# Patient Record
Sex: MALE | Race: WHITE | NOT HISPANIC OR LATINO | Employment: OTHER | URBAN - NONMETROPOLITAN AREA
[De-identification: names, ages, dates, MRNs, and addresses within clinical notes are randomized per-mention and may not be internally consistent; named-entity substitution may affect disease eponyms.]

---

## 2019-07-12 ENCOUNTER — PREP FOR SURGERY (OUTPATIENT)
Dept: OTHER | Facility: HOSPITAL | Age: 69
End: 2019-07-12

## 2019-07-12 DIAGNOSIS — R97.20 ELEVATED PROSTATE SPECIFIC ANTIGEN (PSA): Primary | ICD-10-CM

## 2019-07-26 ENCOUNTER — HOSPITAL ENCOUNTER (OUTPATIENT)
Dept: ULTRASOUND IMAGING | Facility: HOSPITAL | Age: 69
Discharge: HOME | End: 2019-07-26
Admitting: UROLOGY

## 2019-07-26 VITALS
TEMPERATURE: 96.3 F | SYSTOLIC BLOOD PRESSURE: 147 MMHG | OXYGEN SATURATION: 96 % | HEART RATE: 79 BPM | DIASTOLIC BLOOD PRESSURE: 88 MMHG | RESPIRATION RATE: 18 BRPM

## 2019-07-26 DIAGNOSIS — R97.20 ELEVATED PROSTATE SPECIFIC ANTIGEN (PSA): ICD-10-CM

## 2019-07-26 PROCEDURE — 63710000001 DIAZEPAM 5 MG TABLET: Performed by: UROLOGY

## 2019-07-26 PROCEDURE — 76942 ECHO GUIDE FOR BIOPSY: CPT

## 2019-07-26 PROCEDURE — A9270 NON-COVERED ITEM OR SERVICE: HCPCS | Performed by: UROLOGY

## 2019-07-26 PROCEDURE — 88305 TISSUE EXAM BY PATHOLOGIST: CPT | Performed by: PATHOLOGY

## 2019-07-26 PROCEDURE — G0416 PROSTATE BIOPSY, ANY MTHD: HCPCS | Performed by: UROLOGY

## 2019-07-26 RX ORDER — DIAZEPAM 5 MG/1
10 TABLET ORAL ONCE
Status: COMPLETED | OUTPATIENT
Start: 2019-07-26 | End: 2019-07-26

## 2019-07-26 RX ADMIN — DIAZEPAM 10 MG: 5 TABLET ORAL at 13:25

## 2019-07-30 LAB
LAB AP CASE REPORT: NORMAL
LAB AP DIAGNOSIS COMMENT: NORMAL
LAB AP INTRADEPARTMENTAL CONSULT: NORMAL
PATH REPORT.FINAL DX SPEC: NORMAL
PATH REPORT.GROSS SPEC: NORMAL

## 2019-08-13 ENCOUNTER — HOSPITAL ENCOUNTER (OUTPATIENT)
Dept: NUCLEAR MEDICINE | Facility: HOSPITAL | Age: 69
Discharge: HOME | End: 2019-08-13

## 2019-08-13 ENCOUNTER — HOSPITAL ENCOUNTER (OUTPATIENT)
Dept: CT IMAGING | Facility: HOSPITAL | Age: 69
Discharge: HOME | End: 2019-08-13
Admitting: UROLOGY

## 2019-08-13 DIAGNOSIS — C61 MALIGNANT NEOPLASM OF PROSTATE (HCC): ICD-10-CM

## 2019-08-13 PROCEDURE — 25010000002 IOPAMIDOL 61 % SOLUTION: Performed by: UROLOGY

## 2019-08-13 PROCEDURE — A9503 TC99M MEDRONATE: HCPCS | Performed by: UROLOGY

## 2019-08-13 PROCEDURE — 78306 BONE IMAGING WHOLE BODY: CPT

## 2019-08-13 PROCEDURE — 74177 CT ABD & PELVIS W/CONTRAST: CPT

## 2019-08-13 PROCEDURE — 0 TECHNETIUM MEDRONATE KIT: Performed by: UROLOGY

## 2019-08-13 RX ORDER — TC 99M MEDRONATE 20 MG/10ML
25.3 INJECTION, POWDER, LYOPHILIZED, FOR SOLUTION INTRAVENOUS
Status: COMPLETED | OUTPATIENT
Start: 2019-08-13 | End: 2019-08-13

## 2019-08-13 RX ADMIN — Medication 25.3 MILLICURIE: at 09:15

## 2019-08-13 RX ADMIN — IOPAMIDOL 90 ML: 612 INJECTION, SOLUTION INTRAVENOUS at 10:18

## 2019-09-24 ENCOUNTER — HOSPITAL ENCOUNTER (OUTPATIENT)
Dept: RADIATION ONCOLOGY | Facility: HOSPITAL | Age: 69
Setting detail: RADIATION/ONCOLOGY SERIES
End: 2019-09-24

## 2019-09-24 ENCOUNTER — CONSULT (OUTPATIENT)
Dept: RADIATION ONCOLOGY | Facility: HOSPITAL | Age: 69
End: 2019-09-24

## 2019-09-24 VITALS
BODY MASS INDEX: 23.38 KG/M2 | HEIGHT: 72 IN | DIASTOLIC BLOOD PRESSURE: 81 MMHG | HEART RATE: 94 BPM | RESPIRATION RATE: 16 BRPM | WEIGHT: 172.6 LBS | TEMPERATURE: 98 F | SYSTOLIC BLOOD PRESSURE: 122 MMHG

## 2019-09-24 DIAGNOSIS — C61 MALIGNANT NEOPLASM OF PROSTATE (HCC): Primary | ICD-10-CM

## 2019-09-24 PROCEDURE — 99204 OFFICE O/P NEW MOD 45 MIN: CPT | Performed by: RADIOLOGY

## 2019-09-24 PROCEDURE — G0463 HOSPITAL OUTPT CLINIC VISIT: HCPCS | Performed by: RADIOLOGY

## 2019-09-24 RX ORDER — TAMSULOSIN HYDROCHLORIDE 0.4 MG/1
CAPSULE ORAL
COMMUNITY
Start: 2019-09-11

## 2019-09-24 NOTE — PROGRESS NOTES
New Patient Visit       Patient: Kim Kan   YOB: 1950   Medical Record Number: [id number]   Date of Visit: September 24, 2019   Primary Diagnosis:  Stage IIA (cT2b cN0 M0) adenocarcinoma of the prostate, Bob's 3+3=6 with a pretreatment PSA of 5.7 ng/ML (NCCN favorable intermediate risk group).  ICD 10 Code: C61                                               History of Present Illness: Mr. Kim Kan is a 69-year-old white male with recently diagnosed Stage IIA adenocarcinoma of the prostate, [place]'s 3+3=6 with a pretreatment PSA of 5.7 ng/ML.  He has been evaluated by Dr. German and presents to our clinic today to discuss radiation therapy recommendations.    Below is a summary of his relevant history.    7/12/2019 the patient was seen in consultation by Dr. German due to an elevated PSA of 5.7 ng/ML on 6/18/2019.  Digital rectal exam revealed a right sided prostate nodule.  TRUSP with biopsy was scheduled.    7/23/2019 the patient underwent a transrectal ultrasound-guided prostate biopsy per Dr. German.  Pathology: Bob's 3+3=6 adenocarcinoma in 2 of 6 cores (right apex, right middle), involving up to 40% of the biopsy tissue.    8/2/2019 the patient was seen in follow-up by Dr. German.  Staging scans were ordered.    8/13/2019    CT of the abdomen/pelvis revealed an indeterminate small RLL lung nodule, favored to be benign, but follow-up was recommended.  No other evidence to suggest definite metastatic disease in the abdomen or pelvis was identified.     NM whole-body bone scan revealed nonspecific foci of increased uptake in the right side of the mandible, right side of the maxilla, and the right side of the mid cervical spine.  Differential diagnosis included metastatic disease, inflammation, or infection, with additional radiographs recommended.  A foci of increased uptake was also noted in the medial aspect of the right knee and right ankle, probably  kia.    2019 the patient was seen in follow-up by Dr. German.  Treatment options were discussed with the patient, however, no decision was made at that time.    2019 blood work revealed a PSA of 4.300 ng/ML.    2019 the patient was seen in follow-up by Dr. German.  Treatment options were again discussed.  The patient wished to wait until after he had discussed radiation therapy options with us before making a final decision.    The patient presents to our clinic today to discuss radiation therapy options and recommendations.    This is a new problem to me.  (Old medical records were requested, reviewed, and summarized in the HPI)    Review of Systems   Genitourinary: Positive for urgency.       All other systems reviewed and are negative.    AUA/IPSS: 4 (mild symptoms)  QOL due to Urinary Symptoms: 0 (delighted)  MAYA: Incomplete (incomplete)    Past Medical History:   Diagnosis Date   • Arthritis    • Cancer (CMS/HCC)    • Prostate cancer (CMS/HCC) 2019        Past Surgical History:   Procedure Laterality Date   • PROSTATE BIOPSY  2019      Family History   Problem Relation Age of Onset   • Aortic aneurysm Father         Social History     Socioeconomic History   • Marital status:      Spouse name: Not on file   • Number of children: 0   • Years of education: 14   • Highest education level: Not on file   Occupational History   • Occupation: builder   Tobacco Use   • Smoking status: Former Smoker     Packs/day: 2.00     Years: 5.00     Pack years: 10.00     Types: Cigarettes     Start date:      Last attempt to quit:      Years since quittin.7   • Smokeless tobacco: Never Used   Substance and Sexual Activity   • Alcohol use: Yes     Alcohol/week: 2.4 oz     Types: 4 Cans of beer per week   • Drug use: Yes     Types: Marijuana     Comment: 2 times a year   • Sexual activity: Defer     Patient has no known allergies.     Prior to Admission medications   "  Medication Sig Start Date End Date Taking? Authorizing Provider   tamsulosin (FLOMAX) 0.4 MG capsule 24 hr capsule  9/11/19  Yes Provider, MD Niko      Pain: on a scale of 0-10.   Pain Score    09/24/19 1001   PainSc: 0-No pain        Quality of Life:  100 - Full Activity  (0) Fully active, able to carry on all predisease performance without restriction  Advanced Care Plan: N     Physical Examination:  Vitals:     Vitals:    09/24/19 1001   BP: 122/81   Pulse: 94   Resp: 16   Temp: 98 °F (36.7 °C)       Height: 182.9 cm (72\")  Weight:  Weight: 78.3 kg (172 lb 9.6 oz)   Body mass index is 23.41 kg/m².     Constitutional: The patient is a well-developed, well-nourished white male  in no acute distress.  Alert and oriented ×3.  HEENT: Atraumatic. Normocephalic. No abnormalities noted.  Lymphatics: No cervical, supraclavicular, axillary, or inguinal lymphadenopathy is palpated.  CV: Regular rate and rhythm.  No murmurs, rubs, or gallops are appreciated.  Respiratory: Lungs clear to auscultation.  Breath sounds equal bilaterally.  GI: Abdomen soft, nontender, nondistended, with no hepatosplenomegaly or masses palpated.  Rectal: Deferred.  Extremities: No clubbing, cyanosis, or edema.  Neurologic: Cranial nerves II through XII are grossly intact, with no focal neurological deficits noted on exam.  Psychiatric: Alert and oriented x3. Normal affect, with no anxiety or depression noted.    Radiographs :    CT abdomen and pelvis with contrast on  8/13/2019    CLINICAL INDICATION: Prostate cancer  COMPARISON: None   FINDINGS:  Abdomen: There is mild linear atelectasis or scarring in the lung  bases. There are a few right lower lobe small pulmonary nodules  that are indeterminate, one of these is juxtapleural and favored  to be benign with the other two on images 15 and 23 being  indeterminate but favored to be benign. Follow-up of these  however will be needed. The lung bases are otherwise clear. There  is mild fatty " infiltration of the liver. There is a small right  renal cyst. The solid abdominal organs are otherwise  unremarkable. There is no abdominal adenopathy. Vascular  calcifications are noted. There is no free fluid or free air  within the abdomen. The abdominal portion of the GI tract is  unremarkable.   Pelvis: The prostate is enlarged. There is no pelvic adenopathy.  There is no free fluid in the pelvis. The pelvic portion of the  GI tract including the appendix is unremarkable. Degenerative  changes are noted in the lower lumbar spine. Bilateral pars  defects are noted at L5 with grade 2 spondylolisthesis at L5-S1.  No definite bony metastatic lesion is noted.     IMPRESSION:  1. Indeterminate small right lower lobe nodules favored to be  benign but follow-up will be needed.  2. No other evidence to suggest definite metastatic disease in  the abdomen or pelvis.    Whole body bone scan  (8/13/19)   HISTORY:  Prostate cancer    COMPARISON:   none    DOSE:  25.3 mCi of technetium labeled MDP (I.V.)   TECHNIQUE:  Whole body scan, anterior and posterior planar images   FINDINGS:  Physiologic distribution is noted in the kidneys and urinary  bladder.  Foci of increased uptake noted in the medial aspect of the right  knee and in the right ankle, probably degenerative.  Foci of increased uptake in the right side of the mandible and  opposite this in the right side of the maxilla.  Focus of increased uptake noted in the right side of the mid  cervical spine. These findings are nonspecific, cannot exclude  metastatic disease, inflammation, or infection.     IMPRESSION:  CONCLUSION:    1.  Foci of increased uptake in the right side of the mandible  and opposite this in the right side of the maxilla.  Focus of increased uptake noted in the right side of the mid  cervical spine. These findings are nonspecific, cannot exclude  metastatic disease, inflammation, or infection. Recommend  correlation with radiographs.     2. Foci of  increased uptake noted in the medial aspect of the  right knee and in the right ankle, probably degenerative.  Recommend correlation with radiographs.      Pathology:     Tissue Pathology Exam: 7/23/19  Final Diagnosis   1.  PROSTATE GLAND, LEFT APEX, NEEDLE BIOPSY:  BENIGN PROSTATIC TISSUE.   2.  PROSTATE GLAND, LEFT MIDDLE, NEEDLE BIOPSY:  BENIGN PROSTATIC TISSUE.   3.  PROSTATE GLAND, NEEDLE BIOPSY:  BENIGN PROSTATIC TISSUE.   4.  PROSTATE GLAND, RIGHT APEX, NEEDLE BIOPSY:  PROSTATIC ADENOCARCINOMA, BOB'S SCORE 3+3=6 (GRADE GROUP 1), 1 OF 1 CORE, 4 MM, 40% OF THE BIOPSY.   5.  PROSTATE GLAND RIGHT MIDDLE, NEEDLE BIOPSY:  PROSTATIC ADENOCARCINOMA, BOB'S SCORE 3+3=6 (GRADE GROUP 1), 1 OF 1 CORE, 3 MM, 20% OF THE BIOPSY.   6.  PROSTATE GLAND, RIGHT BASE, NEEDLE BIOPSY:  BENIGN PROSTATIC TISSUE.       Labs:   No results found for: GLUCOSE, BUN, CREATININE, EGFRIFNONA, EGFRIFAFRI, BCR, K, CO2, CALCIUM, PROTENTOTREF, ALBUMIN, LABIL2, AST, ALT   No results found for: WBC, HGB, HCT, MCV, PLT     Summary of PSAs:  Date              PSA (ng/ml)  6/2/2017           3.56  9/10/2018         3.82  6/18/2019         5.700    9/9/2019           4.300      ASSESSMENT/PLAN: Mr. Kim Kan is a 69-year-old white male with recently diagnosed Stage IIA (cT2b cN0 M0) adenocarcinoma of the prostate, Bob's 3+3=6 with a pretreatment PSA of 5.7 ng/ML (NCCN favorable intermediate risk group).  The NCCN Guidelines treatment recommendations for favorable intermediate risk group were discussed at length with the patient, including active surveillance, radiation therapy (EBRT or brachytherapy alone), and radical prostatectomy +/- PLND.  All of the patient's questions were answered.  At this time, he feels that he would like to pursue a course of active surveillance rather than radiation therapy or surgery.  He states that he has follow-up scheduled in the near future with Dr. German for a repeat PSA, and plans to continue to  follow closely with Dr. German as part of his active surveillance.  We will be glad to see the patient back in our clinic at any time in the future should he change his mind and decide to proceed with definitive radiation therapy.    Sincerely,    Rene Garcia MD  Radiation Oncology    Electronically signed by Rene Garcia MD 9/24/2019 2:45 PM     cc: Dr. Joe Chaudhary

## 2020-05-29 ENCOUNTER — TRANSCRIBE ORDERS (OUTPATIENT)
Dept: CT IMAGING | Facility: HOSPITAL | Age: 70
End: 2020-05-29

## 2020-05-29 DIAGNOSIS — C61 PROSTATE CANCER (HCC): Primary | ICD-10-CM

## 2020-06-03 ENCOUNTER — HOSPITAL ENCOUNTER (OUTPATIENT)
Dept: CT IMAGING | Facility: HOSPITAL | Age: 70
Discharge: HOME | End: 2020-06-03

## 2020-06-03 ENCOUNTER — HOSPITAL ENCOUNTER (OUTPATIENT)
Dept: NUCLEAR MEDICINE | Facility: HOSPITAL | Age: 70
Discharge: HOME | End: 2020-06-03

## 2020-06-03 ENCOUNTER — LAB (OUTPATIENT)
Dept: LAB | Facility: HOSPITAL | Age: 70
End: 2020-06-03

## 2020-06-03 DIAGNOSIS — C61 PROSTATE CANCER (HCC): ICD-10-CM

## 2020-06-03 LAB
BUN BLD-MCNC: 14 MG/DL (ref 8–23)
CREAT BLD-MCNC: 0.7 MG/DL (ref 0.76–1.27)
GFR SERPL CREATININE-BSD FRML MDRD: 111 ML/MIN/1.73

## 2020-06-03 PROCEDURE — 0 TECHNETIUM MEDRONATE KIT: Performed by: UROLOGY

## 2020-06-03 PROCEDURE — 36415 COLL VENOUS BLD VENIPUNCTURE: CPT

## 2020-06-03 PROCEDURE — A9503 TC99M MEDRONATE: HCPCS | Performed by: UROLOGY

## 2020-06-03 PROCEDURE — 74178 CT ABD&PLV WO CNTR FLWD CNTR: CPT

## 2020-06-03 PROCEDURE — 84520 ASSAY OF UREA NITROGEN: CPT

## 2020-06-03 PROCEDURE — 78306 BONE IMAGING WHOLE BODY: CPT

## 2020-06-03 PROCEDURE — 25010000002 IOPAMIDOL 61 % SOLUTION: Performed by: UROLOGY

## 2020-06-03 PROCEDURE — 82565 ASSAY OF CREATININE: CPT

## 2020-06-03 PROCEDURE — 71260 CT THORAX DX C+: CPT

## 2020-06-03 RX ORDER — TC 99M MEDRONATE 20 MG/10ML
25.1 INJECTION, POWDER, LYOPHILIZED, FOR SOLUTION INTRAVENOUS
Status: COMPLETED | OUTPATIENT
Start: 2020-06-03 | End: 2020-06-03

## 2020-06-03 RX ADMIN — IOPAMIDOL 90 ML: 612 INJECTION, SOLUTION INTRAVENOUS at 09:58

## 2020-06-03 RX ADMIN — Medication 25.1 MILLICURIE: at 09:30

## 2020-06-08 ENCOUNTER — CONSULT (OUTPATIENT)
Dept: SURGERY | Facility: CLINIC | Age: 70
End: 2020-06-08

## 2020-06-08 VITALS
HEART RATE: 63 BPM | SYSTOLIC BLOOD PRESSURE: 120 MMHG | TEMPERATURE: 99 F | DIASTOLIC BLOOD PRESSURE: 80 MMHG | WEIGHT: 160 LBS | BODY MASS INDEX: 21.67 KG/M2 | HEIGHT: 72 IN

## 2020-06-08 DIAGNOSIS — K40.90 LEFT INGUINAL HERNIA: Primary | ICD-10-CM

## 2020-06-08 PROCEDURE — 99203 OFFICE O/P NEW LOW 30 MIN: CPT | Performed by: SURGERY

## 2020-06-08 NOTE — PROGRESS NOTES
CHIEF COMPLAINT:    Left inguinal hernia    HISTORY OF PRESENT ILLNESS:    Kim Kan is a 70 y.o. male who is followed by Dr. German of urology for prostate cancer.  Recently the patient noted a left inguinal bulge which he discussed in the urology clinic.  This appeared to be consistent with a hernia.  The patient was referred to me for further discussion about management of the hernia.    Patient notes no obstructive symptoms.  He notes an occasional bulge in the left groin which reduces with rest.  He notes occasional mild discomfort in the area.    Past Medical History:   Diagnosis Date   • Arthritis    • Cancer (CMS/HCC)    • Prostate cancer (CMS/HCC) 2019       Past Surgical History:   Procedure Laterality Date   • PROSTATE BIOPSY  2019       Prior to Admission medications    Medication Sig Start Date End Date Taking? Authorizing Provider   tamsulosin (FLOMAX) 0.4 MG capsule 24 hr capsule  19  Yes Provider, MD Niko       No Known Allergies    Family History   Problem Relation Age of Onset   • Aortic aneurysm Father        Social History     Socioeconomic History   • Marital status:      Spouse name: Not on file   • Number of children: 0   • Years of education: 14   • Highest education level: Not on file   Occupational History   • Occupation: builder   Tobacco Use   • Smoking status: Former Smoker     Packs/day: 2.00     Years: 5.00     Pack years: 10.00     Types: Cigarettes     Start date:      Last attempt to quit:      Years since quittin.4   • Smokeless tobacco: Never Used   Substance and Sexual Activity   • Alcohol use: Yes     Alcohol/week: 4.0 standard drinks     Types: 4 Cans of beer per week   • Drug use: Yes     Types: Marijuana     Comment: 2 times a year   • Sexual activity: Defer       Review of Systems   Constitutional: Negative for activity change, appetite change, chills and fever.   HENT: Negative for hearing loss, nosebleeds and trouble  "swallowing.    Cardiovascular: Negative for chest pain, palpitations and leg swelling.   Gastrointestinal: Negative for abdominal distention, abdominal pain, anal bleeding, blood in stool, constipation, diarrhea, nausea, rectal pain and vomiting.   Endocrine: Negative for cold intolerance, heat intolerance, polydipsia and polyuria.   Genitourinary: Positive for difficulty urinating. Negative for decreased urine volume, dysuria, enuresis, frequency, hematuria and urgency.   Musculoskeletal: Positive for back pain. Negative for arthralgias, gait problem, myalgias and neck pain.   Skin: Negative for pallor, rash and wound.   Allergic/Immunologic: Negative for immunocompromised state.   Neurological: Negative for dizziness, seizures, weakness, light-headedness, numbness and headaches.   Psychiatric/Behavioral: Negative for agitation and behavioral problems. The patient is not nervous/anxious.        Objective     /80   Pulse 63   Temp 99 °F (37.2 °C) (Oral)   Ht 182.9 cm (72\")   Wt 72.6 kg (160 lb)   BMI 21.70 kg/m²     Physical Exam   Constitutional: He is oriented to person, place, and time. He appears well-developed and well-nourished. No distress.   HENT:   Head: Normocephalic and atraumatic.   Eyes: EOM are normal. Right eye exhibits no discharge. Left eye exhibits no discharge. No scleral icterus.   Neck: No tracheal deviation present.   Cardiovascular: Normal rate and regular rhythm.   Pulmonary/Chest: Effort normal. No respiratory distress.   Abdominal: Soft. He exhibits no distension. There is no tenderness. There is no rebound. A hernia is present. Hernia confirmed positive in the left inguinal area. Hernia confirmed negative in the right inguinal area.   Neurological: He is alert and oriented to person, place, and time.   Skin: Skin is warm. He is not diaphoretic.   Psychiatric: He has a normal mood and affect. His behavior is normal. Judgment and thought content normal.       DIAGNOSTIC " DATA:    Recent CT of the abdomen was reviewed showing what appears to be a left inguinal hernia    ASSESSMENT:    Minimally symptomatic left inguinal hernia    PLAN:    Currently the patient does not desire repair of his left inguinal hernia.  I believe this is reasonable.  We will pursue watchful waiting and this was discussed with him today.  He understands that if the bulge becomes a reducible or acutely/severely painful that he needs to seek urgent medical evaluation.  Otherwise he will return to see me if he desires repair of the hernia.          This document has been electronically signed by Joe Colón MD on June 8, 2020 11:06

## 2022-09-07 ENCOUNTER — TRANSCRIBE ORDERS (OUTPATIENT)
Dept: PHYSICAL THERAPY | Facility: HOSPITAL | Age: 72
End: 2022-09-07

## 2022-09-07 DIAGNOSIS — Z47.1 AFTERCARE FOLLOWING LEFT KNEE JOINT REPLACEMENT SURGERY: Primary | ICD-10-CM

## 2022-09-07 DIAGNOSIS — Z96.652 AFTERCARE FOLLOWING LEFT KNEE JOINT REPLACEMENT SURGERY: Primary | ICD-10-CM

## 2022-09-21 ENCOUNTER — HOSPITAL ENCOUNTER (OUTPATIENT)
Dept: PHYSICAL THERAPY | Facility: HOSPITAL | Age: 72
Setting detail: THERAPIES SERIES
Discharge: HOME | End: 2022-09-21

## 2022-09-21 DIAGNOSIS — Z47.1 AFTERCARE FOLLOWING LEFT KNEE JOINT REPLACEMENT SURGERY: Primary | ICD-10-CM

## 2022-09-21 DIAGNOSIS — M25.562 PAIN IN LATERAL PORTION OF LEFT KNEE: ICD-10-CM

## 2022-09-21 DIAGNOSIS — Z96.652 STATUS POST TOTAL LEFT KNEE REPLACEMENT: ICD-10-CM

## 2022-09-21 DIAGNOSIS — Z96.652 AFTERCARE FOLLOWING LEFT KNEE JOINT REPLACEMENT SURGERY: Primary | ICD-10-CM

## 2022-09-21 PROCEDURE — 97162 PT EVAL MOD COMPLEX 30 MIN: CPT | Performed by: PHYSICAL THERAPIST

## 2022-09-21 NOTE — THERAPY EVALUATION
Outpatient Physical Therapy Ortho Initial Evaluation  [facility]     Patient Name: Kim Kan  : 1950  MRN: [id number]  Today's Date: 2022      Visit Date: 2022     Patient seen for 1 PT sessions.  Patient reports N/A% of improvement.  Next MD appt: 10/03/2022.  Recertification: 10/12/2022.    Therapy Diagnosis: S/P L TKA (DOS: 22)        Patient Active Problem List   Diagnosis   • Malignant neoplasm of prostate (HCC)        Past Medical History:   Diagnosis Date   • Arthritis    • Cancer (HCC)    • Prostate cancer (HCC) 2019        Past Surgical History:   Procedure Laterality Date   • PROSTATE BIOPSY  2019       Visit Dx:     ICD-10-CM ICD-9-CM   1. Aftercare following left knee joint replacement surgery  Z47.1 V54.81    Z96.652 V43.65   2. Status post total left knee replacement  Z96.652 V43.65   3. Pain in lateral portion of left knee  M25.562 719.46          Patient History     Row Name 22 1300             History    Chief Complaint Pain  -AJ      Type of Pain Knee pain  -AJ      Date Current Problem(s) Began --  Chronic, a few months  -AJ      Brief Description of Current Complaint patient reperots his knee had been bothering him for a long time and then it just didn't get worse. he reports that he had injections but they were very short lasting. He reprots he had surgery yesterday and and went home same day.  -AJ      Previous treatment for THIS PROBLEM Injections;Medication;Surgery  -AJ      Surgery Date: 22  -AJ      Patient/Caregiver Goals Relieve pain;Improve mobility;Improve strength;Decrease swelling  -AJ      Current Tobacco Use None  -AJ      Smoking Status FOrmer smoker  -AJ      Patient's Rating of General Health Very good  -AJ      Occupation/sports/leisure activities Occupation:Occupation: retired ; Hobbies: fishing, hunting, hiking  -AJ      Patient seeing anyone else for problem(s)? Ortho  -AJ      What clinical tests  have you had for this problem? X-ray  -AJ      Results of Clinical Tests significant DJD prior to surgery  -AJ              Pain     Pain Location Knee  -AJ      Pain at Present 0  -AJ      Pain at Best 0  -AJ      Pain at Worst 7  -AJ      Pain Frequency Intermittent  -AJ      What Performance Factors Make the Current Problem(s) WORSE? weight bearing, walking, standing, bending  -AJ      What Performance Factors Make the Current Problem(s) BETTER? ice and pain meds  -AJ      Is your sleep disturbed? No  -AJ      Is medication used to assist with sleep? Yes  -AJ            User Key  (r) = Recorded By, (t) = Taken By, (c) = Cosigned By    Initials Name Provider Type    AJ Lisa Duggan, PT DPT Physical Therapist                 PT Ortho     Row Name 09/21/22 1300       Subjective Comments    Subjective Comments see history  -AJ       Precautions and Contraindications    Precautions CANCER HISTORY  -AJ    Contraindications NO ESTIM/US  -AJ       Subjective Pain    Able to rate subjective pain? yes  -AJ    Pre-Treatment Pain Level 0  -AJ    Post-Treatment Pain Level 0  -AJ       Posture/Observations    Observations Ecchymosis/bruising;Edema;Incision healing  Staples in place with post op dressing- saturated in blood (see assessment)  -AJ    Posture/Observations Comments No distress, wearing post op dressing, ace wrap x2 and knee immobolizer.  -AJ       Special Tests/Palpation    Special Tests/Palpation --  Moderate global tenderness  -AJ       Knee Special Tests    Patellar ballotment test (joint effusion) Left:;Positive  -AJ       Left Lower Ext    Lt Knee Extension/Flexion AROM 2°-73°  -AJ       MMT (Manual Muscle Testing)    General MMT Comments Able to SLR with mild lag present  -AJ       Sensation    Sensation WNL? WFL  -AJ    Light Touch Partial deficits in the LLE  -AJ    Additional Comments Localized areas of numbness.  -AJ       Lower Extremity Flexibility    Hamstrings Bilateral:;Mildly  limited;Moderately limited  -AJ       RLE Quick Girth (cm)    Mid patella 42.5 cm  -AJ    Other 1 38.9 cm  circum at the knee join tline  -AJ       LLE Quick Girth (cm)    Mid patella 46.2 cm  -AJ    Other 1 44 cm  circum at the knee joint line  -AJ       Pathomechanics    Lower Extremity Pathomechanics Antalgic with midstance  -AJ       Transfers    Sit-Stand Hardy (Transfers) modified independence  -AJ    Stand-Sit Hardy (Transfers) modified independence  -AJ    Transfers, Sit-Stand-Sit, Assist Device rolling walker  -AJ    Comment, (Transfers) Lean to the R to unweight L LE with sit to/from  immobolizer per MD.  -AJ       Gait/Stairs (Locomotion)    Hardy Level (Gait) modified independence  -    Pattern (Gait) 3-point;step-to  -AJ    Left Sided Gait Deviations decreased knee extension;forward flexed posture;heel strike decreased;hip hiking;weight shift ability decreased  -AJ    Comment, (Gait/Stairs) Wearing post op knee immobolizer per MD with WB.  -AJ          User Key  (r) = Recorded By, (t) = Taken By, (c) = Cosigned By    Initials Name Provider Type    Lisa Salguero, PT DPT Physical Therapist                            Therapy Education  Education Details: HEP: all exercises given today.  Given: HEP, Symptoms/condition management, Pain management, Fall prevention and home safety, Edema management, Bandaging/dressing change, Other (comment) (POC)  Program: New  How Provided: Verbal, Demonstration, Written  Provided to: Patient  Level of Understanding: Verbalized, Demonstrated      PT OP Goals     Row Name 09/21/22 1300          PT Short Term Goals    STG 1 Patient I with HEP and have additions/changes by next recertification.  -AJ     STG 2 AROM L knee flexion >= 90°.  -AJ     STG 3 AROM L knee extension 0°.  -AJ     STG 4 Patient able to ambulate with SC, good heel to toe gait cycle >= 300', non-antalgic, no increase in pain.  -AJ     STG 5 Patient able to perform  sit to/from stand with 1 UE A = WB B LE x20, no increase in pain.  -     STG 6 Patient able to perform L SLR with no lag present x20 reps.  -            Long Term Goals    LTG 1 AROM of the L knee 0°->= 120°.  -     LTG 2 B LE 5/5.  -     LTG 3 Patient able to ascend/descend 3 steps reciprocally with 1 hand rail assist, no increase in pain x10 reps.  -     LTG 4 Patient able to ambulate with no assistive device non-antalgic >= 1/4 mile.  -     LTG 5 Patient to be I with final HEP.  -            Time Calculation    PT Goal Re-Cert Due Date 10/12/22  -           User Key  (r) = Recorded By, (t) = Taken By, (c) = Cosigned By    Initials Name Provider Type    Lisa Salguero, PT DPT Physical Therapist              Barriers to Rehab: Include significant or possible arthritic/degenerative changes that have occurred within the joint, The chronicity of this issue.    Safety Issues: Cancer history, No estim/US         PT Assessment/Plan     Row Name 09/21/22 1400          PT Assessment    Functional Limitations Impaired gait;Impaired locomotion;Limitation in home management;Limitations in community activities;Performance in leisure activities;Performance in self-care ADL  -     Impairments Balance;Edema;Endurance;Impaired aerobic capacity;Impaired flexibility;Impaired muscle endurance;Impaired muscle length;Impaired muscle power;Joint mobility;Muscle strength;Pain;Range of motion  -     Assessment Comments Patient is a 71yo male who presents to the clinic today 1 day post op TKA. He had a knee immobolizer and 2 ace wraps on his L LE. Upon unwrapping his ace wraps it releived a post op dressing saturatted in blood with pooling inthe botom part of the bandage. bandage was changeds ans blood poured out of it when it was removed. No significant leaking out of staples and incision, only mild dapping present in distal area. Also had significant edema present at the patellar region. Incisin was redresed  and patient was sent with enough dressings for 2 changes. Patient was instructed dabs of blood on the dressing was normal but if it was saturated to change it. If he went through 2 more dressings in <24 hours to call the MD office. Patient also has loss of ROM, strength, altered gait and transfer ability as well as the significant edema. Patient's insurance does not allow treatment to begin on the initial evaluation. Small HEP was established.    Skilled PT with address deficits listed.  -AJ     Please refer to paper survey for additional self-reported information No  -AJ     Rehab Potential Good  -AJ     Patient/caregiver participated in establishment of treatment plan and goals Yes  -AJ     Patient would benefit from skilled therapy intervention Yes  -AJ            PT Plan    PT Frequency 3x/week  -AJ     Predicted Duration of Therapy Intervention (PT) 12-20 visits  -AJ     Planned CPT's? PT EVAL MOD COMPLELITY: 21909;PT RE-EVAL: 64348;PT THER PROC EA 15 MIN: 42960;PT THER ACT EA 15 MIN: 37656;PT MANUAL THERAPY EA 15 MIN: 78247;PT NEUROMUSC RE-EDUCATION EA 15 MIN: 69449;PT GAIT TRAINING EA 15 MIN: 26947;PT SELF CARE/HOME MGMT/TRAIN EA 15: 64525;PT HOT OR COLD PACK TREAT MCARE;PT THER SUPP EA 15 MIN  -AJ     PT Plan Comments Progress obverall ROM, strength, gait, balance, edema management, and function.  -AJ           User Key  (r) = Recorded By, (t) = Taken By, (c) = Cosigned By    Initials Name Provider Type    Lisa Salguero, PT DPT Physical Therapist            Other therapeutic activities and/or exercises will be prescribed depending on the patient's progress or lack thereof.       Modalities     Row Name 09/21/22 1300             Ice    Patient reports will apply ice at home to involved area Yes  -HARJEET            User Key  (r) = Recorded By, (t) = Taken By, (c) = Cosigned By    Initials Name Provider Type    Lisa Salguero, PT DPT Physical Therapist               OP Exercises     Row Name  "09/21/22 1300             Subjective Comments    Subjective Comments see history  -AJ              Subjective Pain    Able to rate subjective pain? yes  -AJ      Pre-Treatment Pain Level 0  -AJ      Post-Treatment Pain Level 0  -AJ              Exercise 1    Exercise Name 1 Quad sets  -AJ      Reps 1 10  -AJ      Time 1 5\" hold  -AJ              Exercise 2    Exercise Name 2 L SLR  -AJ      Sets 2 1  -AJ      Reps 2 5  -AJ      Time 2 5\" hold  -AJ              Exercise 3    Exercise Name 3 L heel prop  -AJ      Time 3 3 min  -AJ              Exercise 4    Exercise Name 4 L heel slides  -AJ      Reps 4 10  -AJ      Time 4 5\" hold  -AJ            User Key  (r) = Recorded By, (t) = Taken By, (c) = Cosigned By    Initials Name Provider Type    Lisa Salguero, PT DPT Physical Therapist            All therapeutic interventions performed today were to address current functional limitations and/or deficits in addressing all physical therapy goals.                    Outcome Measure Options: Lower Extremity Functional Scale (LEFS)  Lower Extremity Functional Index  Any of your usual work, housework or school activities: Extreme difficulty or unable to perform activity  Your usual hobbies, recreational or sporting activities: Quite a bit of difficulty  Getting into or out of the bath: A little bit of difficulty  Walking between rooms: A little bit of difficulty  Putting on your shoes or socks: A little bit of difficulty  Squatting: A little bit of difficulty  Lifting an object, like a bag of groceries from the floor: A little bit of difficulty  Performing light activities around your home: No difficulty  Performing heavy activities around your home: A little bit of difficulty  Getting into or out of a car: Quite a bit of difficulty  Walking 2 blocks: Quite a bit of difficulty  Walking a mile: Extreme difficulty or unable to perform activity  Going up or down 10 stairs (about 1 flight of stairs): Quite a bit of " difficulty  Standing for 1 hour: Quite a bit of difficulty  Sitting for 1 hour: No difficulty  Running on even ground: Extreme difficulty or unable to perform activity  Running on uneven ground: Extreme difficulty or unable to perform activity  Making sharp turns while running fast: Extreme difficulty or unable to perform activity  Hopping: Extreme difficulty or unable to perform activity  Rolling over in bed: A little bit of difficulty  Total: 34      Time Calculation:     Start Time: 1303  Stop Time: 1346  Time Calculation (min): 43 min     Therapy Charges for Today     Code Description Service Date Service Provider Modifiers Qty    61099498832 HC PT EVAL MOD COMPLEXITY 3 9/21/2022 Lisa Duggan, PT DPT GP 1    48564361697 HC PT THER SUPP EA 15 MIN 9/21/2022 Lisa Duggan, PT DPT GP 2          PT G-Codes  Outcome Measure Options: Lower Extremity Functional Scale (LEFS)  Total: 34       This document has been electronically signed by Lisa Duggan, PT DPT, [facility] on September 21, 2022 14:35 CDT

## 2022-09-23 ENCOUNTER — HOSPITAL ENCOUNTER (OUTPATIENT)
Dept: PHYSICAL THERAPY | Facility: HOSPITAL | Age: 72
Setting detail: THERAPIES SERIES
Discharge: HOME | End: 2022-09-23

## 2022-09-23 DIAGNOSIS — Z47.1 AFTERCARE FOLLOWING LEFT KNEE JOINT REPLACEMENT SURGERY: Primary | ICD-10-CM

## 2022-09-23 DIAGNOSIS — Z96.652 STATUS POST TOTAL LEFT KNEE REPLACEMENT: ICD-10-CM

## 2022-09-23 DIAGNOSIS — Z96.652 AFTERCARE FOLLOWING LEFT KNEE JOINT REPLACEMENT SURGERY: Primary | ICD-10-CM

## 2022-09-23 DIAGNOSIS — M25.562 LEFT KNEE PAIN, UNSPECIFIED CHRONICITY: ICD-10-CM

## 2022-09-23 PROCEDURE — 97110 THERAPEUTIC EXERCISES: CPT | Performed by: PHYSICAL THERAPIST

## 2022-09-23 NOTE — THERAPY TREATMENT NOTE
Outpatient Physical Therapy Ortho Treatment Note  [facility]     Patient Name: Kim Kan  : 1950  MRN: [id number]  Today's Date: 2022      Visit Date: 2022     Patient seen for 2 PT sessions.  Patient reports N/A% of improvement.  Next MD appt: 10/03/2022.  Recertification: 10/12/2022.     Therapy Diagnosis: S/P L TKA (DOS: 22)    Visit Dx:    ICD-10-CM ICD-9-CM   1. Aftercare following left knee joint replacement surgery  Z47.1 V54.81    Z96.652 V43.65   2. Status post total left knee replacement  Z96.652 V43.65   3. Left knee pain, unspecified chronicity  M25.562 719.46       Patient Active Problem List   Diagnosis   • Malignant neoplasm of prostate (HCC)        Past Medical History:   Diagnosis Date   • Arthritis    • Cancer (HCC)    • Prostate cancer (HCC) 2019        Past Surgical History:   Procedure Laterality Date   • PROSTATE BIOPSY  2019        PT Ortho     Row Name 22 1000       Subjective Comments    Subjective Comments Patient reports he has only had o change the dressing once since he was here. He reports just a little blood on the bandage too. he reports stiffness, but no real pain.  -AJ       Precautions and Contraindications    Precautions CANCER HISTORY  -    Contraindications NO ESTIM/US  -       Subjective Pain    Able to rate subjective pain? yes  -AJ    Pre-Treatment Pain Level 0  -AJ    Post-Treatment Pain Level 0  -AJ       Posture/Observations    Observations Ecchymosis/bruising;Edema;Incision healing  -       Knee Special Tests    Patellar ballotment test (joint effusion) Left:;Negative  -       Left Lower Ext    Lt Knee Extension/Flexion AROM 1°-85°  -          User Key  (r) = Recorded By, (t) = Taken By, (c) = Cosigned By    Initials Name Provider Type    Lisa Salguero, CYNTHIA DPT Physical Therapist                             PT Assessment/Plan     Row Name 22 1000          PT Assessment    Assessment  "Comments Patient had improvements in both flexion and extension progressing towards goals. Patient also has minimal lag with SLR. Was unable to make full erveloption on PRo II until last attempt at the end of the 10 minutes of rocking.  -            PT Plan    PT Frequency 3x/week  -     PT Plan Comments Add St. HS S and sit to/from stand next session. Try to move PRo II seat up and encourage to push ROM to full revolution.  -AJ           User Key  (r) = Recorded By, (t) = Taken By, (c) = Cosigned By    Initials Name Provider Type    Lisa Salguero, PT DPT Physical Therapist                 Modalities     Row Name 09/23/22 1000             Ice    Ice Applied Yes  -      Location L knee with elevation  -      PT Ice Rx Minutes 10  -AJ      Ice S/P Rx Yes  -AJ            User Key  (r) = Recorded By, (t) = Taken By, (c) = Cosigned By    Initials Name Provider Type    Lisa Salguero, PT DPT Physical Therapist               OP Exercises     Row Name 09/23/22 1000             Subjective Comments    Subjective Comments Patient reports he has only had o change the dressing once since he was here. He reports just a little blood on the bandage too. he reports stiffness, but no real pain.  -AJ              Subjective Pain    Able to rate subjective pain? yes  -AJ      Pre-Treatment Pain Level 0  -AJ      Post-Treatment Pain Level 0  -AJ              Exercise 1    Exercise Name 1 Dressing change  -AJ              Exercise 2    Exercise Name 2 Pro II- Rocking for ROM  -AJ      Time 2 10 min  -AJ      Additional Comments Beatrice s=10  -AJ              Exercise 3    Exercise Name 3 L Quad sets  -AJ      Reps 3 20  -AJ      Time 3 5\" hold  -AJ              Exercise 4    Exercise Name 4 L Hamstring sets  -AJ      Reps 4 20  -AJ      Time 4 5\" hold  -AJ              Exercise 5    Exercise Name 5 L add set  -AJ      Reps 5 20  -AJ      Time 5 5\" hold  -AJ              Exercise 6    Exercise Name 6 L SAQ  -AJ  " "    Sets 6 2  -AJ      Reps 6 10  -      Time 6 5\" hold  -              Exercise 7    Exercise Name 7 L SLR  -      Sets 7 2  -      Reps 7 10  -      Time 7 5\" hold  -              Exercise 8    Exercise Name 8 Heel prop  -      Time 8 5 min  -              Exercise 9    Exercise Name 9 Heel slides with strap  -            User Key  (r) = Recorded By, (t) = Taken By, (c) = Cosigned By    Initials Name Provider Type    Lisa Salguero, PT DPT Physical Therapist            All therapeutic interventions performed today were to address current functional limitations and/or deficits in addressing all physical therapy goals.                    PT OP Goals     Row Name 09/23/22 1000          PT Short Term Goals    STG 1 Patient I with HEP and have additions/changes by next recertification.  -     STG 1 Progress Met;Ongoing  -     STG 2 AROM L knee flexion >= 90°.  -     STG 2 Progress Ongoing;Progressing  -     STG 3 AROM L knee extension 0°.  -     STG 3 Progress Ongoing;Progressing  -     STG 4 Patient able to ambulate with SC, good heel to toe gait cycle >= 300', non-antalgic, no increase in pain.  -     STG 5 Patient able to perform sit to/from stand with 1 UE A = WB B LE x20, no increase in pain.  -     STG 6 Patient able to perform L SLR with no lag present x20 reps.  -     STG 6 Progress Ongoing;Progressing  -     STG 6 Progress Comments <5° lag with SLR.  -            Long Term Goals    LTG 1 AROM of the L knee 0°->= 120°.  -     LTG 2 B LE 5/5.  -     LTG 3 Patient able to ascend/descend 3 steps reciprocally with 1 hand rail assist, no increase in pain x10 reps.  -     LTG 4 Patient able to ambulate with no assistive device non-antalgic >= 1/4 mile.  -     LTG 5 Patient to be I with final HEP.  -            Time Calculation    PT Goal Re-Cert Due Date 10/12/22  -           User Key  (r) = Recorded By, (t) = Taken By, (c) = Cosigned By    Initials Name " Provider Type     Lisa Duggan, PT DPT Physical Therapist                               Time Calculation:   Start Time: 0954  Stop Time: 1102  Time Calculation (min): 68 min  PT Non-Billable Time (min): 10 min  Total Timed Code Minutes- PT: 58 minute(s)  Untimed Charges  PT Ice Rx Minutes: 10  Total Minutes  Untimed Charges Total Minutes: 10   Total Minutes: 10  Therapy Charges for Today     Code Description Service Date Service Provider Modifiers Qty    65129156828 HC PT THER SUPP EA 15 MIN 9/23/2022 Lisa Duggan, PT DPT GP 1    36550120570 HC PT THER PROC EA 15 MIN 9/23/2022 Lisa Duggan, PT DPT GP 4                  This document has been electronically signed by Lisa Duggan PT DPT, [facility] on September 23, 2022 11:07 CDT

## 2022-09-26 ENCOUNTER — HOSPITAL ENCOUNTER (OUTPATIENT)
Dept: PHYSICAL THERAPY | Facility: HOSPITAL | Age: 72
Setting detail: THERAPIES SERIES
Discharge: HOME | End: 2022-09-26

## 2022-09-26 DIAGNOSIS — Z96.652 STATUS POST TOTAL LEFT KNEE REPLACEMENT: ICD-10-CM

## 2022-09-26 DIAGNOSIS — Z96.652 AFTERCARE FOLLOWING LEFT KNEE JOINT REPLACEMENT SURGERY: Primary | ICD-10-CM

## 2022-09-26 DIAGNOSIS — Z47.1 AFTERCARE FOLLOWING LEFT KNEE JOINT REPLACEMENT SURGERY: Primary | ICD-10-CM

## 2022-09-26 DIAGNOSIS — M25.562 LEFT KNEE PAIN, UNSPECIFIED CHRONICITY: ICD-10-CM

## 2022-09-26 PROCEDURE — 97110 THERAPEUTIC EXERCISES: CPT | Performed by: PHYSICAL THERAPIST

## 2022-09-26 NOTE — THERAPY TREATMENT NOTE
Outpatient Physical Therapy Ortho Treatment Note  [facility]     Patient Name: Kim Kan  : 1950  MRN: [id number]  Today's Date: 2022      Visit Date: 2022     Patient seen for 3 PT sessions.  Patient reports N/A% of improvement.  Next MD appt: 10/03/2022.  Recertification: 10/12/2022.     Therapy Diagnosis: S/P L TKA (DOS: 22)    Visit Dx:    ICD-10-CM ICD-9-CM   1. Aftercare following left knee joint replacement surgery  Z47.1 V54.81    Z96.652 V43.65   2. Status post total left knee replacement  Z96.652 V43.65   3. Left knee pain, unspecified chronicity  M25.562 719.46       Patient Active Problem List   Diagnosis   • Malignant neoplasm of prostate (HCC)        Past Medical History:   Diagnosis Date   • Arthritis    • Cancer (HCC)    • Prostate cancer (HCC) 2019        Past Surgical History:   Procedure Laterality Date   • PROSTATE BIOPSY  2019        PT Ortho     Row Name 22 1500       Precautions and Contraindications    Precautions CANCER HISTORY  -    Contraindications NO ESTIM/US  -AJ       Subjective Pain    Able to rate subjective pain? yes  -       Posture/Observations    Observations Ecchymosis/bruising;Edema;Incision healing  -    Posture/Observations Comments COmes in with SC today. Stil lwearing immobolizer on L LE.  -AJ       Left Lower Ext    Lt Knee Extension/Flexion AROM 0°-107°  -AJ       MMT (Manual Muscle Testing)    General MMT Comments ~5° lag remains with SLR.  -          User Key  (r) = Recorded By, (t) = Taken By, (c) = Cosigned By    Initials Name Provider Type    Lisa Salguero, PT DPT Physical Therapist                             PT Assessment/Plan     Row Name 22 1500          PT Assessment    Assessment Comments Discussed with patient about not switching to SC just yet, that he needs more quad control first. Able to make full revolutions on Pro II at closer seat right away today. Met STGs for both  "flexion and extension today.  -AJ            PT Plan    PT Frequency 3x/week  -AJ     PT Plan Comments Add step ups next session.  -AJ           User Key  (r) = Recorded By, (t) = Taken By, (c) = Cosigned By    Initials Name Provider Type    Lisa Salguero, PT DPT Physical Therapist                 Modalities     Row Name 09/26/22 1500             Ice    Ice Applied Yes  -AJ      Location L knee with elevation  -AJ      PT Ice Rx Minutes 10  -AJ      Ice S/P Rx Yes  -AJ            User Key  (r) = Recorded By, (t) = Taken By, (c) = Cosigned By    Initials Name Provider Type    Lisa Salguero, PT DPT Physical Therapist               OP Exercises     Row Name 09/26/22 1500             Subjective Comments    Subjective Comments Patient comes in saying he hasn't had to change the dressing at all but his pain ball is empty and he requests assistnace to remove the catheter.  -AJ              Subjective Pain    Able to rate subjective pain? yes  -AJ      Pre-Treatment Pain Level 0  -AJ      Post-Treatment Pain Level 0  -AJ              Exercise 1    Exercise Name 1 Catheter removal for pain ball.  -AJ              Exercise 2    Exercise Name 2 Pro II- LE for ROM/strength  -AJ      Time 2 10 min  -AJ      Additional Comments Beatrice s=8, L 4.0  -AJ              Exercise 3    Exercise Name 3 L St. HS S  -AJ      Reps 3 2  -AJ      Time 3 30 seconds  -AJ              Exercise 4    Exercise Name 4 L St. Lunge S  -AJ      Reps 4 10  -AJ      Time 4 10\" hold  -AJ              Exercise 5    Exercise Name 5 Sit to/from stand  -AJ      Sets 5 1  -AJ      Reps 5 10  -AJ      Additional Comments B UE A, cues to not weight shift off L LE  -AJ              Exercise 6    Exercise Name 6 L Quad sets  -AJ      Reps 6 20  -AJ      Time 6 5\" hold  -AJ              Exercise 7    Exercise Name 7 L SLR  -AJ      Sets 7 2  -AJ      Reps 7 10  -AJ      Time 7 5\" hold  -AJ      Additional Comments Mild lag ~5° remains  -AJ        " "      Exercise 8    Exercise Name 8 Heel prop  -      Time 8 5 min  -              Exercise 9    Exercise Name 9 Heel slides with strap  -      Time 9 5\" holds for 5 minutes  -            User Key  (r) = Recorded By, (t) = Taken By, (c) = Cosigned By    Initials Name Provider Type    Lisa Salguero, PT DPT Physical Therapist            All therapeutic interventions performed today were to address current functional limitations and/or deficits in addressing all physical therapy goals.                    PT OP Goals     Row Name 09/26/22 1500          PT Short Term Goals    STG 1 Patient I with HEP and have additions/changes by next recertification.  -     STG 1 Progress Met;Ongoing  -     STG 2 AROM L knee flexion >= 90°.  -     STG 2 Progress Met  -     STG 3 AROM L knee extension 0°.  -     STG 3 Progress Met  -     STG 4 Patient able to ambulate with SC, good heel to toe gait cycle >= 300', non-antalgic, no increase in pain.  -     STG 4 Progress Ongoing  -     STG 5 Patient able to perform sit to/from stand with 1 UE A = WB B LE x20, no increase in pain.  -     STG 5 Progress Ongoing;Progressing  -     STG 6 Patient able to perform L SLR with no lag present x20 reps.  -     STG 6 Progress Ongoing;Progressing  -     STG 6 Progress Comments <5° lag with SLR.  -            Long Term Goals    LTG 1 AROM of the L knee 0°->= 120°.  -     LTG 2 B LE 5/5.  -     LTG 3 Patient able to ascend/descend 3 steps reciprocally with 1 hand rail assist, no increase in pain x10 reps.  -     LTG 4 Patient able to ambulate with no assistive device non-antalgic >= 1/4 mile.  -     LTG 5 Patient to be I with final HEP.  -            Time Calculation    PT Goal Re-Cert Due Date 10/12/22  -           User Key  (r) = Recorded By, (t) = Taken By, (c) = Cosigned By    Initials Name Provider Type    Lisa Salguero, PT DPT Physical Therapist                Therapy " Education  Education Details: HEP:St.  HS S, St. Lunge S,  sit to/from stand, QS, SLR, heel prop, heel slides  Given: HEP  Program: Reinforced, Progressed, Modified  How Provided: Verbal, Demonstration, Written  Provided to: Patient  Level of Understanding: Verbalized, Demonstrated              Time Calculation:   Start Time: 1559  Stop Time: 1655  Time Calculation (min): 56 min  PT Non-Billable Time (min): 10 min  Total Timed Code Minutes- PT: 46 minute(s)  Untimed Charges  PT Ice Rx Minutes: 10  Total Minutes  Untimed Charges Total Minutes: 10   Total Minutes: 10  Therapy Charges for Today     Code Description Service Date Service Provider Modifiers Qty    44327312545 HC PT THER SUPP EA 15 MIN 9/26/2022 Lisa Duggan, PT DPT GP 1    48313803117 HC PT THER PROC EA 15 MIN 9/26/2022 Lisa Duggan, PT DPT GP 3                This document has been electronically signed by Lisa Duggan, PT DPT, [facility] on September 26, 2022 17:26 CDT

## 2022-09-28 ENCOUNTER — HOSPITAL ENCOUNTER (OUTPATIENT)
Dept: PHYSICAL THERAPY | Facility: HOSPITAL | Age: 72
Setting detail: THERAPIES SERIES
Discharge: HOME | End: 2022-09-28

## 2022-09-28 DIAGNOSIS — Z96.652 STATUS POST TOTAL LEFT KNEE REPLACEMENT: ICD-10-CM

## 2022-09-28 DIAGNOSIS — Z96.652 AFTERCARE FOLLOWING LEFT KNEE JOINT REPLACEMENT SURGERY: Primary | ICD-10-CM

## 2022-09-28 DIAGNOSIS — Z47.1 AFTERCARE FOLLOWING LEFT KNEE JOINT REPLACEMENT SURGERY: Primary | ICD-10-CM

## 2022-09-28 DIAGNOSIS — M25.562 LEFT KNEE PAIN, UNSPECIFIED CHRONICITY: ICD-10-CM

## 2022-09-28 PROCEDURE — 97530 THERAPEUTIC ACTIVITIES: CPT | Performed by: PHYSICAL THERAPIST

## 2022-09-28 PROCEDURE — 97110 THERAPEUTIC EXERCISES: CPT | Performed by: PHYSICAL THERAPIST

## 2022-09-28 NOTE — THERAPY TREATMENT NOTE
Outpatient Physical Therapy Ortho Treatment Note  [facility]     Patient Name: Kim Kan  : 1950  MRN: [id number]  Today's Date: 2022      Visit Date: 2022     Patient seen for 4 PT sessions.  Patient reports N/A% of improvement.  Next MD appt: 10/03/2022.  Recertification: 10/12/2022.     Therapy Diagnosis: S/P L TKA (DOS: 22)    Visit Dx:    ICD-10-CM ICD-9-CM   1. Aftercare following left knee joint replacement surgery  Z47.1 V54.81    Z96.652 V43.65   2. Status post total left knee replacement  Z96.652 V43.65   3. Left knee pain, unspecified chronicity  M25.562 719.46       Patient Active Problem List   Diagnosis   • Malignant neoplasm of prostate (HCC)        Past Medical History:   Diagnosis Date   • Arthritis    • Cancer (HCC)    • Prostate cancer (HCC) 2019        Past Surgical History:   Procedure Laterality Date   • PROSTATE BIOPSY  2019        PT Ortho     Row Name 22 1300       Subjective Comments    Subjective Comments Patient reports no pain in his knee but he woke up and his heel and bottom os his foot were bothering him today.  -AJ       Precautions and Contraindications    Precautions CANCER HISTORY  -AJ    Contraindications NO ESTIM/US  -AJ       Subjective Pain    Able to rate subjective pain? yes  -AJ    Pre-Treatment Pain Level 0  -AJ    Post-Treatment Pain Level 3  -AJ          User Key  (r) = Recorded By, (t) = Taken By, (c) = Cosigned By    Initials Name Provider Type    Lisa Salguero, PT DPT Physical Therapist                             PT Assessment/Plan     Row Name 22 1300          PT Assessment    Assessment Comments Still requires cueing ot not weight shift off L LE with sit to/from stand. Met SLR goal today. Discussed with patient about discontinueing immobolizer but still using RW until we could go over gait with SC here in the clinic. Good effort with new ther ex, lateral step ups more difficult that fwd.   "-AJ            PT Plan    PT Frequency 3x/week  -AJ     PT Plan Comments Go over gait with SC next session.  -AJ           User Key  (r) = Recorded By, (t) = Taken By, (c) = Cosigned By    Initials Name Provider Type    Lisa Salguero, PT DPT Physical Therapist                 Modalities     Row Name 09/28/22 1300             Ice    Ice Applied Yes  -AJ      Location L knee with elevation  -AJ      PT Ice Rx Minutes 10  -AJ      Ice S/P Rx Yes  -AJ            User Key  (r) = Recorded By, (t) = Taken By, (c) = Cosigned By    Initials Name Provider Type    Lisa Salguero, PT DPT Physical Therapist               OP Exercises     Row Name 09/28/22 1300             Subjective Comments    Subjective Comments Patient reports no pain in his knee but he woke up and his heel and bottom os his foot were bothering him today.  -AJ              Subjective Pain    Able to rate subjective pain? yes  -AJ      Pre-Treatment Pain Level 0  -AJ      Post-Treatment Pain Level 3  -AJ              Exercise 1    Exercise Name 1 pro II LE- strength/ROM  -AJ      Time 1 10 min  -AJ      Additional Comments L 6.0, s=7  -AJ              Exercise 2    Exercise Name 2 B St. HS S  -AJ      Reps 2 2  -AJ      Time 2 30 seconds  -AJ              Exercise 3    Exercise Name 3 B St. inclince calf S  -AJ      Reps 3 2  -AJ      Time 3 30 seconds  -AJ              Exercise 4    Exercise Name 4 L St. Lunge S  -AJ      Reps 4 10  -AJ      Time 4 10\" hold  -AJ              Exercise 5    Exercise Name 5 L Step up F/L  -AJ      Sets 5 1  -AJ      Reps 5 15  -AJ      Additional Comments 6\" step  -AJ              Exercise 6    Exercise Name 6 Sit to/from stand  -AJ      Sets 6 2  -AJ      Reps 6 10  -AJ      Additional Comments B UE A, cues for = WB  -AJ              Exercise 7    Exercise Name 7 L LAQ  -AJ      Reps 7 20  -AJ      Time 7 5\" hold  -AJ              Exercise 8    Exercise Name 8 L SLR  -AJ      Reps 8 20  -AJ      Time 8 5\" " "hold  -              Exercise 9    Exercise Name 9 L heel slides with strap  -      Time 9 5\" holds for 5 minutes  -            User Key  (r) = Recorded By, (t) = Taken By, (c) = Cosigned By    Initials Name Provider Type    Lisa Salguero, PT DPT Physical Therapist            All therapeutic interventions performed today were to address current functional limitations and/or deficits in addressing all physical therapy goals.                    PT OP Goals     Row Name 09/28/22 1300          PT Short Term Goals    STG 1 Patient I with HEP and have additions/changes by next recertification.  -     STG 1 Progress Met;Ongoing  -     STG 2 AROM L knee flexion >= 90°.  -     STG 2 Progress Met  -     STG 3 AROM L knee extension 0°.  -     STG 3 Progress Met  -     STG 4 Patient able to ambulate with SC, good heel to toe gait cycle >= 300', non-antalgic, no increase in pain.  -     STG 4 Progress Ongoing  -     STG 5 Patient able to perform sit to/from stand with 1 UE A = WB B LE x20, no increase in pain.  -     STG 5 Progress Ongoing;Progressing  -     STG 6 Patient able to perform L SLR with no lag present x20 reps.  -     STG 6 Progress Ongoing;Progressing  -     STG 6 Progress Comments <5° lag with SLR.  -            Long Term Goals    LTG 1 AROM of the L knee 0°->= 120°.  -     LTG 2 B LE 5/5.  -     LTG 3 Patient able to ascend/descend 3 steps reciprocally with 1 hand rail assist, no increase in pain x10 reps.  -     LTG 4 Patient able to ambulate with no assistive device non-antalgic >= 1/4 mile.  -     LTG 5 Patient to be I with final HEP.  -           User Key  (r) = Recorded By, (t) = Taken By, (c) = Cosigned By    Initials Name Provider Type    Lisa Salguero, CYNTHIA DPT Physical Therapist                Therapy Education  Given: HEP  Program: Reinforced  How Provided: Verbal  Provided to: Patient  Level of Understanding: Verbalized              Time " Calculation:   Start Time: 1340  Stop Time: 1445  Time Calculation (min): 65 min  PT Non-Billable Time (min): 10 min  Total Timed Code Minutes- PT: 55 minute(s)  Untimed Charges  PT Ice Rx Minutes: 10  Total Minutes  Untimed Charges Total Minutes: 10   Total Minutes: 10  Therapy Charges for Today     Code Description Service Date Service Provider Modifiers Qty    99011650693 HC PT THER SUPP EA 15 MIN 9/28/2022 Lisa Duggan, PT DPT GP 1    48550307954 HC PT THER PROC EA 15 MIN 9/28/2022 Lisa Duggan, PT DPT GP 3    67797662678 HC PT THERAPEUTIC ACT EA 15 MIN 9/28/2022 Lias Duggan, PT DPT GP 1                  This document has been electronically signed by Lisa Duggan PT DPT, [facility] on September 28, 2022 15:02 CDT

## 2022-09-30 ENCOUNTER — HOSPITAL ENCOUNTER (OUTPATIENT)
Dept: PHYSICAL THERAPY | Facility: HOSPITAL | Age: 72
Setting detail: THERAPIES SERIES
Discharge: HOME | End: 2022-09-30

## 2022-09-30 DIAGNOSIS — Z96.652 STATUS POST TOTAL LEFT KNEE REPLACEMENT: ICD-10-CM

## 2022-09-30 DIAGNOSIS — M25.562 LEFT KNEE PAIN, UNSPECIFIED CHRONICITY: ICD-10-CM

## 2022-09-30 DIAGNOSIS — Z96.652 AFTERCARE FOLLOWING LEFT KNEE JOINT REPLACEMENT SURGERY: Primary | ICD-10-CM

## 2022-09-30 DIAGNOSIS — Z47.1 AFTERCARE FOLLOWING LEFT KNEE JOINT REPLACEMENT SURGERY: Primary | ICD-10-CM

## 2022-09-30 PROCEDURE — 97110 THERAPEUTIC EXERCISES: CPT | Performed by: PHYSICAL THERAPIST

## 2022-10-03 ENCOUNTER — HOSPITAL ENCOUNTER (OUTPATIENT)
Dept: PHYSICAL THERAPY | Facility: HOSPITAL | Age: 72
Setting detail: THERAPIES SERIES
Discharge: HOME | End: 2022-10-03

## 2022-10-03 DIAGNOSIS — M25.562 LEFT KNEE PAIN, UNSPECIFIED CHRONICITY: ICD-10-CM

## 2022-10-03 DIAGNOSIS — Z47.1 AFTERCARE FOLLOWING LEFT KNEE JOINT REPLACEMENT SURGERY: Primary | ICD-10-CM

## 2022-10-03 DIAGNOSIS — Z96.652 AFTERCARE FOLLOWING LEFT KNEE JOINT REPLACEMENT SURGERY: Primary | ICD-10-CM

## 2022-10-03 DIAGNOSIS — Z96.652 STATUS POST TOTAL LEFT KNEE REPLACEMENT: ICD-10-CM

## 2022-10-03 DIAGNOSIS — M25.562 PAIN IN LATERAL PORTION OF LEFT KNEE: ICD-10-CM

## 2022-10-03 PROCEDURE — 97110 THERAPEUTIC EXERCISES: CPT

## 2022-10-03 NOTE — THERAPY TREATMENT NOTE
"    Outpatient Physical Therapy Ortho Treatment Note  [facility]     Patient Name: Kim Kan  : 1950  MRN: [id number]  Today's Date: 10/3/2022      Visit Date: 10/03/2022     Patient has attended 6 visits  States that he feels \"Some\"% Improvement  MD Visit: 1 month  Recheck Date: 10-    Therapy Diagnosis: S/P L TKA (DOS 2022)      Visit Dx:    ICD-10-CM ICD-9-CM   1. Aftercare following left knee joint replacement surgery  Z47.1 V54.81    Z96.652 V43.65   2. Status post total left knee replacement  Z96.652 V43.65   3. Left knee pain, unspecified chronicity  M25.562 719.46   4. Pain in lateral portion of left knee  M25.562 719.46       Patient Active Problem List   Diagnosis   • Malignant neoplasm of prostate (HCC)        Past Medical History:   Diagnosis Date   • Arthritis    • Cancer (HCC)    • Prostate cancer (HCC) 2019        Past Surgical History:   Procedure Laterality Date   • PROSTATE BIOPSY  2019        PT Ortho     Row Name 10/03/22 1400       Precautions and Contraindications    Precautions CANCER HISTORY  -    Contraindications NO ESTIM/US  -       Posture/Observations    Posture/Observations Comments presents ambulating with cane, bandage on knee with patient reporting staples removed at Surgeon's office.  -          User Key  (r) = Recorded By, (t) = Taken By, (c) = Cosigned By    Initials Name Provider Type     Ellie Richardson PTA Physical Therapist Assistant                             PT Assessment/Plan     Row Name 10/03/22 1400          PT Assessment    Assessment Comments patient has no difficulty with the initiation of shuttle press today. gives good effort throughout treatment. initially needs cueing to decrease circumduction with hurdles today but is able to complete appropriately with cueing.  -            PT Plan    PT Frequency 3x/week  -     PT Plan Comments next visit lateral step over hurdles  -           User Key  (r) = " Recorded By, (t) = Taken By, (c) = Cosigned By    Initials Name Provider Type     Ellie Richardson PTA Physical Therapist Assistant                   OP Exercises     Row Name 10/03/22 1400             Subjective Comments    Subjective Comments patient reports that he saw the surgeon's office today and they removed his staples. states that he is doing a lot better than he thought he would be doing.  -              Subjective Pain    Able to rate subjective pain? yes  -      Pre-Treatment Pain Level 0  -      Post-Treatment Pain Level 0  -              Exercise 1    Exercise Name 1 Pro II LE's for strengthening  -      Time 1 10 minutes  -      Additional Comments Seat 6 Level 7  -              Exercise 2    Exercise Name 2 B Incline Calf S  -      Reps 2 2  -MH      Time 2 30 sec hold  -              Exercise 3    Exercise Name 3 B St. HS S  -MH      Reps 3 2  -MH      Time 3 30 sec hold  -MH              Exercise 4    Exercise Name 4 L Fwd Step Ups  -      Reps 4 20  -MH      Additional Comments 6 inch  -MH              Exercise 5    Exercise Name 5 L Lat Step Ups  -      Reps 5 20  -MH      Additional Comments 6 inch  -MH              Exercise 6    Exercise Name 6 L Ecc Step Downs  -      Reps 6 20  -MH      Additional Comments 6 inch  -MH              Exercise 7    Exercise Name 7 2L Shuttle Press  -      Time 7 5 minutes  -      Additional Comments 6 cords  -MH              Exercise 8    Exercise Name 8 1L Shuttle Press  -MH      Time 8 3 minutes  -      Additional Comments 3 cords  -MH              Exercise 9    Exercise Name 9 Fwd Reciprocal Step Over Hurdles  -      Reps 9 10 laps  -      Additional Comments Small Hurdles  -            User Key  (r) = Recorded By, (t) = Taken By, (c) = Cosigned By    Initials Name Provider Type     Ellie Richardson PTA Physical Therapist Assistant                              PT OP Goals     Row Name 10/03/22 1400          PT Short Term  Goals    STG 1 Patient I with HEP and have additions/changes by next recertification.  -     STG 1 Progress Met  -     STG 2 AROM L knee flexion >= 90°.  -     STG 2 Progress Met  -     STG 3 AROM L knee extension 0°.  -     STG 3 Progress Met  -     STG 4 Patient able to ambulate with SC, good heel to toe gait cycle >= 300', non-antalgic, no increase in pain.  -     STG 4 Progress Met  -     STG 5 Patient able to perform sit to/from stand with 1 UE A = WB B LE x20, no increase in pain.  -     STG 5 Progress Ongoing;Progressing  -     STG 6 Patient able to perform L SLR with no lag present x20 reps.  -     STG 6 Progress Met  -            Long Term Goals    LTG 1 AROM of the L knee 0°->= 120°.  -     LTG 1 Progress Met  -     LTG 2 B LE 5/5.  -     LTG 3 Patient able to ascend/descend 3 steps reciprocally with 1 hand rail assist, no increase in pain x10 reps.  -     LTG 4 Patient able to ambulate with no assistive device non-antalgic >= 1/4 mile.  -     LTG 5 Patient to be I with final HEP.  -            Time Calculation    PT Goal Re-Cert Due Date 10/12/22  -           User Key  (r) = Recorded By, (t) = Taken By, (c) = Cosigned By    Initials Name Provider Type     Ellie Richardson PTA Physical Therapist Assistant                Therapy Education  Education Details: step ups fwd and lateral at home  Given: HEP  Program: Reinforced, New  How Provided: Verbal, Demonstration  Provided to: Patient  Level of Understanding: Verbalized, Demonstrated              Time Calculation:   Start Time: 1426  Stop Time: 1515  Time Calculation (min): 49 min  Total Timed Code Minutes- PT: 49 minute(s)  Therapy Charges for Today     Code Description Service Date Service Provider Modifiers Qty    19083129592 HC PT THER SUPP EA 15 MIN 10/3/2022 Ellie Richardson PTA GP, CQ 1    31143108544 HC PT THER PROC EA 15 MIN 10/3/2022 Ellie Richardson PTA GP, CQ 3                    Ellie Richardson  PTA  10/3/2022       This document has been electronically signed by Ellie Richardson, HITESH on October 3, 2022 15:23 CDT

## 2022-10-05 ENCOUNTER — HOSPITAL ENCOUNTER (OUTPATIENT)
Dept: PHYSICAL THERAPY | Facility: HOSPITAL | Age: 72
Setting detail: THERAPIES SERIES
Discharge: HOME | End: 2022-10-05

## 2022-10-05 DIAGNOSIS — M25.562 LEFT KNEE PAIN, UNSPECIFIED CHRONICITY: ICD-10-CM

## 2022-10-05 DIAGNOSIS — Z96.652 AFTERCARE FOLLOWING LEFT KNEE JOINT REPLACEMENT SURGERY: Primary | ICD-10-CM

## 2022-10-05 DIAGNOSIS — M25.562 PAIN IN LATERAL PORTION OF LEFT KNEE: ICD-10-CM

## 2022-10-05 DIAGNOSIS — Z96.652 STATUS POST TOTAL LEFT KNEE REPLACEMENT: ICD-10-CM

## 2022-10-05 DIAGNOSIS — Z47.1 AFTERCARE FOLLOWING LEFT KNEE JOINT REPLACEMENT SURGERY: Primary | ICD-10-CM

## 2022-10-05 PROCEDURE — 97110 THERAPEUTIC EXERCISES: CPT

## 2022-10-05 NOTE — THERAPY TREATMENT NOTE
"    Outpatient Physical Therapy Ortho Treatment Note  [facility]     Patient Name: Kim Kan  : 1950  MRN: [id number]  Today's Date: 10/5/2022      Visit Date: 10/05/2022     Patient has attended 7 visits  \"Yes\"% Improvement  MD Visit: 1 month  Recheck Date: 10-    Therapy Diagnosis: S/P L TKA (DOS 2022)      Visit Dx:    ICD-10-CM ICD-9-CM   1. Aftercare following left knee joint replacement surgery  Z47.1 V54.81    Z96.652 V43.65   2. Status post total left knee replacement  Z96.652 V43.65   3. Left knee pain, unspecified chronicity  M25.562 719.46   4. Pain in lateral portion of left knee  M25.562 719.46       Patient Active Problem List   Diagnosis   • Malignant neoplasm of prostate (HCC)        Past Medical History:   Diagnosis Date   • Arthritis    • Cancer (HCC)    • Prostate cancer (HCC) 2019        Past Surgical History:   Procedure Laterality Date   • PROSTATE BIOPSY  2019        PT Ortho     Row Name 10/05/22 1600       Subjective Comments    Subjective Comments stats that he was sore last visit but nothing bad.  -       Precautions and Contraindications    Precautions CANCER HISTORY  -    Contraindications NO ESTIM/US  -       Subjective Pain    Able to rate subjective pain? yes  -    Pre-Treatment Pain Level 0  -    Post-Treatment Pain Level 0  -          User Key  (r) = Recorded By, (t) = Taken By, (c) = Cosigned By    Initials Name Provider Type    Ellie Childs PTA Physical Therapist Assistant                             PT Assessment/Plan     Row Name 10/05/22 1600          PT Assessment    Assessment Comments patient does well with sit to stand using no UE assist, though 4-6 repetitions he does have some compensation and shifts away from affected leg. he is able ot ambulate up and down 3 steps with single handrail assist in a reciprocal fashion with no difficulty and no increase in complaints of pain. tolerating increased resistance on " shuttle for single leg press. gives good effort throughout treatment.  -            PT Plan    PT Frequency 3x/week  -     PT Plan Comments ambulation with no assistive device; work toward being able to ascend/descend 10 inch step to facilitate patients ability to climb the [employer] ladder that leads to his sleeping loft at home.  -           User Key  (r) = Recorded By, (t) = Taken By, (c) = Cosigned By    Initials Name Provider Type     Ellie Richardson PTA Physical Therapist Assistant                   OP Exercises     Row Name 10/05/22 1600             Subjective Comments    Subjective Comments stats that he was sore last visit but nothing bad.  -              Subjective Pain    Able to rate subjective pain? yes  -      Pre-Treatment Pain Level 0  -      Post-Treatment Pain Level 0  -              Exercise 1    Exercise Name 1 Pro II LE's for strengthening  -      Time 1 10 minutes  -      Additional Comments L 8.0  -              Exercise 2    Exercise Name 2 B St. HS S  -      Reps 2 2  -      Time 2 30 sec hold  -              Exercise 3    Exercise Name 3 L St. Lunge S  -      Reps 3 10  -      Time 3 5 sec hold  -              Exercise 4    Exercise Name 4 Reciprocal Stairs with single handrail  -      Reps 4 10  -              Exercise 5    Exercise Name 5 1L Shuttle Press  -      Time 5 5 minutes  -      Additional Comments 4 cords  -              Exercise 6    Exercise Name 6 sit to stand with no UE  -      Reps 6 20  -      Additional Comments focus on equal WB  -              Exercise 7    Exercise Name 7 Lateral Step Over Large Milind  -      Reps 7 10 laps  -              Exercise 8    Exercise Name 8 Fwd Step Over Large Hurdles  -      Reps 8 10 laps  -            User Key  (r) = Recorded By, (t) = Taken By, (c) = Cosigned By    Initials Name Provider Type     Ellie Richardson PTA Physical Therapist Assistant                              PT  OP Goals     Row Name 10/05/22 1600          PT Short Term Goals    STG 1 Patient I with HEP and have additions/changes by next recertification.  -     STG 1 Progress Met  -     STG 2 AROM L knee flexion >= 90°.  -     STG 2 Progress Met  -     STG 3 AROM L knee extension 0°.  -     STG 3 Progress Met  -     STG 4 Patient able to ambulate with SC, good heel to toe gait cycle >= 300', non-antalgic, no increase in pain.  -     STG 4 Progress Met  [facility]     STG 5 Patient able to perform sit to/from stand with 1 UE A = WB B LE x20, no increase in pain.  -     STG 5 Progress Partially Met  -     STG 5 Progress Comments some compensation with leaning off of affected leg for 4-6 reps but completes with no UE  -     STG 6 Patient able to perform L SLR with no lag present x20 reps.  -     STG 6 Progress Met  [facility]            Long Term Goals    LTG 1 AROM of the L knee 0°->= 120°.  -     LTG 1 Progress Met  [facility]     LTG 2 B LE 5/5.  -     LTG 3 Patient able to ascend/descend 3 steps reciprocally with 1 hand rail assist, no increase in pain x10 reps.  -     LTG 3 Progress Met  [facility]     LTG 4 Patient able to ambulate with no assistive device non-antalgic >= 1/4 mile.  -     LTG 5 Patient to be I with final HEP.  -            Time Calculation    PT Goal Re-Cert Due Date 10/12/22  -           User Key  (r) = Recorded By, (t) = Taken By, (c) = Cosigned By    Initials Name Provider Type     Ellie Richardson PTA Physical Therapist Assistant                               Time Calculation:   Start Time: 1602  Stop Time: 1648  Time Calculation (min): 46 min  Total Timed Code Minutes- PT: 46 minute(s)  Therapy Charges for Today     Code Description Service Date Service Provider Modifiers Qty    46896106349 HC PT THER SUPP EA 15 MIN 10/5/2022 Ellie Richardson PTA GP, CQ 1    43670206353 HC PT THER PROC EA 15 MIN 10/5/2022 Ellie Richardson PTA GP, CQ 3                    Ellie Richardson PTA  10/5/2022       This  document has been electronically signed by Ellie Richardson PTA on October 5, 2022 16:48 CDT

## 2022-10-07 ENCOUNTER — HOSPITAL ENCOUNTER (OUTPATIENT)
Dept: PHYSICAL THERAPY | Facility: HOSPITAL | Age: 72
Setting detail: THERAPIES SERIES
Discharge: HOME | End: 2022-10-07

## 2022-10-07 DIAGNOSIS — Z47.1 AFTERCARE FOLLOWING LEFT KNEE JOINT REPLACEMENT SURGERY: Primary | ICD-10-CM

## 2022-10-07 DIAGNOSIS — Z96.652 AFTERCARE FOLLOWING LEFT KNEE JOINT REPLACEMENT SURGERY: Primary | ICD-10-CM

## 2022-10-07 PROCEDURE — 97110 THERAPEUTIC EXERCISES: CPT

## 2022-10-07 PROCEDURE — 97116 GAIT TRAINING THERAPY: CPT

## 2022-10-07 NOTE — THERAPY TREATMENT NOTE
"    Outpatient Physical Therapy Ortho Treatment Note  [facility]     Patient Name: Kim Kan  : 1950  MRN: [id number]  Today's Date: 10/7/2022      Visit Date: 10/07/2022  Patient has attended 8 visits  \"Yes\"% Improvement  MD Visit: 1 month  Recheck Date: 10-     Therapy Diagnosis: S/P L TKA (DOS 2022)     Visit Dx:    ICD-10-CM ICD-9-CM   1. Aftercare following left knee joint replacement surgery  Z47.1 V54.81    Z96.652 V43.65       Patient Active Problem List   Diagnosis   • Malignant neoplasm of prostate (HCC)        Past Medical History:   Diagnosis Date   • Arthritis    • Cancer (HCC)    • Prostate cancer (HCC) 2019        Past Surgical History:   Procedure Laterality Date   • PROSTATE BIOPSY  2019        PT Ortho     Row Name 10/07/22 1100       Subjective Comments    Subjective Comments reports his knee feels great today. Reports he has been walking a lot and it seems to help. No other new complaints.  -AC       Precautions and Contraindications    Precautions CANCER HISTORY  -AC    Contraindications NO ESTIM/US  -AC       Subjective Pain    Able to rate subjective pain? yes  -AC    Pre-Treatment Pain Level 0  -AC    Post-Treatment Pain Level 0  -AC          User Key  (r) = Recorded By, (t) = Taken By, (c) = Cosigned By    Initials Name Provider Type    Ebony Rice, PT Physical Therapist                             PT Assessment/Plan     Row Name 10/07/22 1100          PT Assessment    Assessment Comments Pt with good effort this treatment session. Pt able to perform SL shuttle press with 5 cords vs 4 cords. Pt able to perform 8\" step ups with no increase in symptoms or pain. Continue functional activities. Add in more ambulation without SPC next visit.  -AC            PT Plan    PT Frequency 3x/week  -AC     PT Plan Comments Next visit add track walk and continue step ups on 10\" step. Add SLS balance activities.  -AC           User Key  (r) = Recorded " "By, (t) = Taken By, (c) = Cosigned By    Initials Name Provider Type    AC Ebony Lee, PT Physical Therapist                   OP Exercises     Row Name 10/07/22 1100             Subjective Comments    Subjective Comments reports his knee feels great today. Reports he has been walking a lot and it seems to help. No other new complaints.  -AC              Subjective Pain    Able to rate subjective pain? yes  -AC      Pre-Treatment Pain Level 0  -AC      Post-Treatment Pain Level 0  -AC              Exercise 1    Exercise Name 1 Pro II LE's for strengthening  -AC      Time 1 10 minutes  -AC      Additional Comments L 7.0  -AC              Exercise 2    Exercise Name 2 B St. HS S  -AC      Reps 2 2  -AC      Time 2 30 sec hold  -AC              Exercise 3    Exercise Name 3 L St. Lunge S  -AC      Reps 3 10  -AC      Time 3 5 sec hold  -AC              Exercise 4    Exercise Name 4 Reciprocal Stairs with single handrail  -AC      Reps 4 10  -AC              Exercise 5    Exercise Name 5 1L Shuttle Press  -AC      Time 5 5 minutes  -AC      Additional Comments 5 cords  -AC              Exercise 6    Exercise Name 6 sit to stand with no UE  -AC      Reps 6 20  -AC      Additional Comments focus on equal WB  -AC              Exercise 7    Exercise Name 7 Lateral Step Over Large Milind  -AC      Reps 7 10 laps  -AC              Exercise 8    Exercise Name 8 Fwd Step Over Large Hurdles  -AC      Reps 8 10 laps  -AC              Exercise 9    Exercise Name 9 Step ups  -AC      Sets 9 1  -AC      Reps 9 15  -AC      Additional Comments 10\" step with risers in // bars  -AC              Exercise 10    Exercise Name 10 gait without AD  -AC            User Key  (r) = Recorded By, (t) = Taken By, (c) = Cosigned By    Initials Name Provider Type    AC Ebony Lee, PT Physical Therapist                              PT OP Goals     Row Name 10/07/22 1100          PT Short Term Goals    STG 1 Patient I with HEP and have " additions/changes by next recertification.  -     STG 1 Progress Met  -     STG 2 AROM L knee flexion >= 90°.  -AC     STG 2 Progress Met  -     STG 3 AROM L knee extension 0°.  -     STG 3 Progress Met  -     STG 4 Patient able to ambulate with SC, good heel to toe gait cycle >= 300', non-antalgic, no increase in pain.  -AC     STG 4 Progress Met  -     STG 5 Patient able to perform sit to/from stand with 1 UE A = WB B LE x20, no increase in pain.  -AC     STG 5 Progress Partially Met  -     STG 6 Patient able to perform L SLR with no lag present x20 reps.  -     STG 6 Progress Met  -            Long Term Goals    LTG 1 AROM of the L knee 0°->= 120°.  -     LTG 1 Progress Met  -     LTG 2 B LE 5/5.  -     LTG 3 Patient able to ascend/descend 3 steps reciprocally with 1 hand rail assist, no increase in pain x10 reps.  -     LTG 3 Progress Met  -     LTG 4 Patient able to ambulate with no assistive device non-antalgic >= 1/4 mile.  -     LTG 5 Patient to be I with final HEP.  -            Time Calculation    PT Goal Re-Cert Due Date 10/12/22  -           User Key  (r) = Recorded By, (t) = Taken By, (c) = Cosigned By    Initials Name Provider Type    [facility], PT Physical Therapist                               Time Calculation:   Start Time: 1133  Stop Time: 1215  Time Calculation (min): 42 min  Therapy Charges for Today     Code Description Service Date Service Provider Modifiers Qty    57272828348 HC PT THER SUPP EA 15 MIN 10/7/2022 [facility], PT GP 1    30630017552 HC GAIT TRAINING EA 15 MIN 10/7/2022 [facility], PT GP 1    27874595161 HC PT THER PROC EA 15 MIN 10/7/2022 [facility], PT GP 2                    Ebony [place], CYNTHIA , DPT 10/7/2022

## 2022-10-12 ENCOUNTER — HOSPITAL ENCOUNTER (OUTPATIENT)
Dept: PHYSICAL THERAPY | Facility: HOSPITAL | Age: 72
Setting detail: THERAPIES SERIES
Discharge: HOME | End: 2022-10-12

## 2022-10-12 DIAGNOSIS — Z96.652 STATUS POST TOTAL LEFT KNEE REPLACEMENT: ICD-10-CM

## 2022-10-12 DIAGNOSIS — Z47.1 AFTERCARE FOLLOWING LEFT KNEE JOINT REPLACEMENT SURGERY: Primary | ICD-10-CM

## 2022-10-12 DIAGNOSIS — M25.562 PAIN IN LATERAL PORTION OF LEFT KNEE: ICD-10-CM

## 2022-10-12 DIAGNOSIS — Z96.652 AFTERCARE FOLLOWING LEFT KNEE JOINT REPLACEMENT SURGERY: Primary | ICD-10-CM

## 2022-10-12 DIAGNOSIS — M25.562 LEFT KNEE PAIN, UNSPECIFIED CHRONICITY: ICD-10-CM

## 2022-10-12 PROCEDURE — 97110 THERAPEUTIC EXERCISES: CPT

## 2022-10-12 NOTE — THERAPY TREATMENT NOTE
Outpatient Physical Therapy Ortho Treatment Note  [facility]     Patient Name: Kim Kan  : 1950  MRN: [id number]  Today's Date: 10/12/2022     Pt seen for 9 PT sessions  Reported Improvement:  75-80 %  MD Visit: 10/31/2022  Recheck Date: 10/12/2022    Therapy Diagnosis: S/P L TKA (DOS 2022)      Visit Date: 10/12/2022    Visit Dx:    ICD-10-CM ICD-9-CM   1. Aftercare following left knee joint replacement surgery  Z47.1 V54.81    Z96.652 V43.65   2. Status post total left knee replacement  Z96.652 V43.65   3. Left knee pain, unspecified chronicity  M25.562 719.46   4. Pain in lateral portion of left knee  M25.562 719.46       Patient Active Problem List   Diagnosis   • Malignant neoplasm of prostate (HCC)        Past Medical History:   Diagnosis Date   • Arthritis    • Cancer (HCC)    • Prostate cancer (HCC) 2019        Past Surgical History:   Procedure Laterality Date   • PROSTATE BIOPSY  2019                        PT Assessment/Plan     Row Name 10/12/22 1000          PT Assessment    Assessment Comments pt with good effor twith all therex.  Good challenge with ambulation without UE support.  Track walk wihtout increased pain.  Pt with good mechanics with gait.  Occasional steppage gait with flat foot noted.  Instructed in HR/TR and added to HEP  -        PT Plan    PT Frequency 3x/week  -     PT Plan Comments Continue to progress towards gait without AD  -JW           User Key  (r) = Recorded By, (t) = Taken By, (c) = Cosigned By    Initials Name Provider Type    Temitope Awad PTA Physical Therapist Assistant                   OP Exercises     Row Name 10/12/22 0900             Subjective Comments    Subjective Comments Knee feels great, no pain at all.  -         Subjective Pain    Able to rate subjective pain? yes  -      Pre-Treatment Pain Level 0  -      Post-Treatment Pain Level 0  -         Exercise 1    Exercise Name 1 Pro II LE's for  "strengthening  -JW      Time 1 10 minutes  -JW      Additional Comments L 7.0  -JW         Exercise 2    Exercise Name 2 B St. HS S  -JW      Reps 2 2  -JW      Time 2 30 sec hold  -JW         Exercise 3    Exercise Name 3 L St. Lunge S  -JW      Reps 3 10  -JW      Time 3 10 sec  -JW         Exercise 4    Exercise Name 4 B incline gastroc st  -JW      Reps 4 2  -JW      Time 4 30  -JW         Exercise 5    Exercise Name 5 Sit to stands  -JW      Sets 5 2  -JW      Reps 5 10  -JW      Additional Comments no UE assist, ECC control, equal WB  -JW         Exercise 6    Exercise Name 6 FWd Hurdles  -JW      Reps 6 5 laps  -JW      Time 6 no UE assist  -JW      Additional Comments 6\" hurdles x 6 ,  -JW         Exercise 7    Exercise Name 7 Track walk  -JW      Reps 7 4 laps  -JW         Exercise 8    Exercise Name 8 Ice with elevation  -JW      Time 8 10 min  -JW            User Key  (r) = Recorded By, (t) = Taken By, (c) = Cosigned By    Initials Name Provider Type    Temitope Awad PTA Physical Therapist Assistant                              PT OP Goals     Row Name 10/12/22 0900          PT Short Term Goals    STG 1 Patient I with HEP and have additions/changes by next recertification.  -JW     STG 1 Progress Met  -     STG 2 AROM L knee flexion >= 90°.  -     STG 2 Progress Met  -     STG 3 AROM L knee extension 0°.  -     STG 3 Progress Met  -     STG 4 Patient able to ambulate with SC, good heel to toe gait cycle >= 300', non-antalgic, no increase in pain.  -     STG 4 Progress Met  -     STG 5 Patient able to perform sit to/from stand with 1 UE A = WB B LE x20, no increase in pain.  -     STG 5 Progress Met  -     STG 6 Patient able to perform L SLR with no lag present x20 reps.  -     STG 6 Progress Met  -        Long Term Goals    LTG 1 AROM of the L knee 0°->= 120°.  -     LTG 1 Progress Met  -     LTG 2 B LE 5/5.  -     LTG 3 Patient able to ascend/descend 3 steps reciprocally " with 1 hand rail assist, no increase in pain x10 reps.  -HERACLIO     LTG 3 Progress Met  -     LTG 4 Patient able to ambulate with no assistive device non-antalgic >= 1/4 mile.  -HERACLIO     [facility] with final [facility]P.  -HERACLIO        Time Calculation    PT Goal Re-Cert Due Date 10/12/22  -           User Key  (r) = Recorded By, (t) = Taken By, (c) = Cosigned By    Initials Name Provider Type    Temitope Awad PTA Physical Therapist Assistant                Therapy Education  Education Details: HR/TR  Given: Posture/body mechanics, Symptoms/condition management  Program: Reinforced  How Provided: Verbal, Demonstration  Provided to: Patient  Level of Understanding: Verbalized, Teach back education performed, Demonstrated              Time Calculation:   Start Time: 0911  Stop Time: 1019  Time Calculation (min): 68 min  PT Non-Billable Time (min): 10 min  Total Timed Code Minutes- PT: 58 minute(s)  Therapy Charges for Today     Code Description Service Date Service Provider Modifiers Qty    63240610294 HC PT THER PROC EA 15 MIN 10/12/2022 Temitope Payton PTA GP, CQ 4    20490174965 HC PT THER SUPP EA 15 MIN 10/12/2022 Temitope Payton PTA GP, CQ 1                    Temitope Payton PTA  10/12/2022

## 2022-10-14 ENCOUNTER — HOSPITAL ENCOUNTER (OUTPATIENT)
Dept: PHYSICAL THERAPY | Facility: HOSPITAL | Age: 72
Setting detail: THERAPIES SERIES
Discharge: HOME | End: 2022-10-14

## 2022-10-14 DIAGNOSIS — Z47.1 AFTERCARE FOLLOWING LEFT KNEE JOINT REPLACEMENT SURGERY: Primary | ICD-10-CM

## 2022-10-14 DIAGNOSIS — Z96.652 STATUS POST TOTAL LEFT KNEE REPLACEMENT: ICD-10-CM

## 2022-10-14 DIAGNOSIS — Z96.652 AFTERCARE FOLLOWING LEFT KNEE JOINT REPLACEMENT SURGERY: Primary | ICD-10-CM

## 2022-10-14 DIAGNOSIS — M25.562 LEFT KNEE PAIN, UNSPECIFIED CHRONICITY: ICD-10-CM

## 2022-10-14 DIAGNOSIS — M25.562 PAIN IN LATERAL PORTION OF LEFT KNEE: ICD-10-CM

## 2022-10-14 PROCEDURE — 97110 THERAPEUTIC EXERCISES: CPT

## 2022-10-14 PROCEDURE — 97116 GAIT TRAINING THERAPY: CPT

## 2022-10-14 NOTE — THERAPY TREATMENT NOTE
Outpatient Physical Therapy Ortho Progress Note  [facility]     Patient Name: Kim Kan  : 1950  MRN: [id number]  Today's Date: 10/14/2022      Visit Date: 10/14/2022   Pt seen for 10 PT sessions  Reported Improvement:  75-80%  MD Visit: 10/31/2022  Recheck Date: N/A      Therapy Diagnosis: S/P L TKA (DOS 2022)           Visit Dx:    ICD-10-CM ICD-9-CM   1. Aftercare following left knee joint replacement surgery  Z47.1 V54.81    Z96.652 V43.65   2. Status post total left knee replacement  Z96.652 V43.65   3. Left knee pain, unspecified chronicity  M25.562 719.46   4. Pain in lateral portion of left knee  M25.562 719.46       Patient Active Problem List   Diagnosis   • Malignant neoplasm of prostate (HCC)        Past Medical History:   Diagnosis Date   • Arthritis    • Cancer (HCC)    • Prostate cancer (HCC) 2019        Past Surgical History:   Procedure Laterality Date   • PROSTATE BIOPSY  2019        PT Ortho     Row Name 10/14/22 1000       Subjective Comments    Subjective Comments Knee feels good. No issues  -AC       Precautions and Contraindications    Precautions CANCER HISTORY  -MH    Contraindications NO ESTIM/  -       Subjective Pain    Able to rate subjective pain? yes  -    Pre-Treatment Pain Level 0  -MH    Post-Treatment Pain Level 0  -AC       Posture/Observations    Posture/Observations Comments ambulates into clinic with cane. Posture WFL. No antalgic gait present on L LE.  -AC       Left Lower Ext    Lt Knee Extension/Flexion AROM 0°-125°  -AC       MMT (Manual Muscle Testing)    General MMT Comments B LE 5/5  -AC       Lower Extremity Flexibility    Hamstrings Bilateral:;Mildly limited;Moderately limited  -AC       RLE Quick Girth (cm)    Mid patella 42.5 cm  -AC    Other 1 38.9 cm  -AC       LLE Quick Girth (cm)    Mid patella 43 cm  -AC    Other 1 39 cm  -AC       Balance Skills Training    Balance Comments WFL.  -AC       Transfers    Comment,  (Transfers) I with all transfers. Requires no AD.  -AC       Gait/Stairs (Locomotion)    Comment, (Gait/Stairs) Pt mod I with ambulation into clinic using SPC. Pt able to ambulate without SPC with good heel strike, follow through, and no deviations noted. Pt able to ascend/descend 3 stairs with no LOB or unsteadiness present with step through gait pattern.  -          User Key  (r) = Recorded By, (t) = Taken By, (c) = Cosigned By    Initials Name Provider Type     Ellie Richardson PTA Physical Therapist Assistant     Ebony Lee, PT Physical Therapist                             PT Assessment/Plan     Row Name 10/14/22 1049 10/14/22 1000       PT Assessment    Assessment Comments Pt has met all STG and LTG and is functionally independent at this time. Pt has improved his L knee AROM to 0-125° and B LE strength to 5/5. Pt also able to ambulate over 1/2 mile without pain or increased discomfort per pt report. Pt demo proper heel strike, equal step length, normal stride, and no deviations note during gait training without SPC. Pt also improve LEFS score from 34/80 to 70/80 indicating minimal impairments. Pt would benefit from DC at this time due to meeting al goals and returning to PLOF. Pt verbalizes agreement with DC. Pt provided with updated HEP.  -AC --       PT Plan    PT Frequency Other (comment)  DC  -AC 3x/week  -    PT Plan Comments DC this date  - --          User Key  (r) = Recorded By, (t) = Taken By, (c) = Cosigned By    Initials Name Provider Type    lElie Childs PTA Physical Therapist Assistant     Ebony Lee, PT Physical Therapist                   OP Exercises     Row Name 10/14/22 1000             Subjective Comments    Subjective Comments Knee feels good. No issues  -         Subjective Pain    Able to rate subjective pain? yes  -      Pre-Treatment Pain Level 0  -      Post-Treatment Pain Level 0  -         Exercise 1    Exercise Name 1 Pro II LE's for  "strengthening  -AC      Time 1 10 minutes  -AC      Additional Comments L 9.0  -AC         Exercise 2    Exercise Name 2 B St. HS S  -AC      Reps 2 2  -AC      Time 2 30 sec hold  -AC         Exercise 3    Exercise Name 3 L St. Lunge S  -AC      Reps 3 10  -AC      Time 3 10 sec  -AC         Exercise 4    Exercise Name 4 B incline gastroc st  -AC      Reps 4 2  -AC      Time 4 30  -AC         Exercise 5    Exercise Name 5 Track walk  -AC      Reps 5 2 laps  -AC      Additional Comments no AD  -AC         Exercise 6    Exercise Name 6 F/L Hurdles  -AC      Reps 6 5 laps  -AC      Time 6 no UE assist  -AC      Additional Comments 6\" hurdles x 6  -AC         Exercise 7    Exercise Name 7 STS  -AC      Reps 7 20  -AC      Additional Comments no UE assist  -AC         Exercise 8    Exercise Name 8 St. HR/TR  -AC      Reps 8 10 each  -AC         Exercise 9    Exercise Name 9 Reassessment  -AC      Time 9 7 mins  -AC      Additional Comments --  -AC         Exercise 10    Exercise Name 10 Pt ed on HEP, POC, and discharge  -AC      Time 10 6 mins  -AC      Additional Comments Added St. HR/TR, HS stretch, gastroc/soleus stretch, mini squats, STS.  -AC            User Key  (r) = Recorded By, (t) = Taken By, (c) = Cosigned By    Initials Name Provider Type     Ellie Richardson, PTA Physical Therapist Assistant    Ebony Rice, PT Physical Therapist                              PT OP Goals     Row Name 10/14/22 1026 10/14/22 1000       PT Short Term Goals    STG 1 Patient I with HEP and have additions/changes by next recertification.  -AC Patient I with HEP and have additions/changes by next recertification.  -    STG 1 Progress Met  -AC Met  -    STG 2 AROM L knee flexion >= 90°.  -AC AROM L knee flexion >= 90°.  -    STG 2 Progress Met  -AC Met  -    STG 3 AROM L knee extension 0°.  -AC AROM L knee extension 0°.  -    STG 3 Progress Met  -AC Met  -    STG 4 Patient able to ambulate with SC, good heel to " toe gait cycle >= 300', non-antalgic, no increase in pain.  -AC Patient able to ambulate with SC, good heel to toe gait cycle >= 300', non-antalgic, no increase in pain.  -    STG 4 Progress Met  - Met  -    STG 5 Patient able to perform sit to/from stand with 1 UE A = WB B LE x20, no increase in pain.  -AC Patient able to perform sit to/from stand with 1 UE A = WB B LE x20, no increase in pain.  -    STG 5 Progress Met  - Met  -    STG 6 Patient able to perform L SLR with no lag present x20 reps.  -AC Patient able to perform L SLR with no lag present x20 reps.  -    STG 6 Progress Met  [facility] Met  [facility]       Long Term Goals    LTG 1 AROM of the L knee 0°->= 120°.  -AC AROM of the L knee 0°->= 120°.  -    LTG 1 Progress Met  [facility] Met  [facility]    LTG 2 B LE 5/5.  - B LE 5/5.  -    LTG 2 Progress Met  - --    LTG 3 Patient able to ascend/descend 3 steps reciprocally with 1 hand rail assist, no increase in pain x10 reps.  - Patient able to ascend/descend 3 steps reciprocally with 1 hand rail assist, no increase in pain x10 reps.  -    LTG 3 Progress Met  -AC Met  [facility]    LTG 4 Patient able to ambulate with no assistive device non-antalgic >= 1/4 mile.  - Patient able to ambulate with no assistive device non-antalgic >= 1/4 mile.  -    LTG 4 Progress Met  - Progressing  -    LTG 5 Patient to be I with final HEP.  - Patient to be I with final HEP.  -    LTG 5 Progress Met  [facility] --       Time Calculation    PT Goal Re-Cert Due Date -- --  DC  -          User Key  (r) = Recorded By, (t) = Taken By, (c) = Cosigned By    Initials Name Provider Type    Ellie Childs, PTA Physical Therapist Assistant     Ebony Lee PT Physical Therapist                Therapy Education  Given: HEP, Symptoms/condition management, Fall prevention and home safety  Program: New, Reinforced  How Provided: Verbal, Demonstration, Written  Provided to: Patient  Level of Understanding: Teach back education  performed, Verbalized, Demonstrated       Lower Extremity Functional Index  Any of your usual work, housework or school activities: No difficulty  Your usual hobbies, recreational or sporting activities: A little bit of difficulty  Getting into or out of the bath: No difficulty  Walking between rooms: No difficulty  Putting on your shoes or socks: A little bit of difficulty  Squatting: A little bit of difficulty  Lifting an object, like a bag of groceries from the floor: No difficulty  Performing light activities around your home: No difficulty  Performing heavy activities around your home: No difficulty  Getting into or out of a car: A little bit of difficulty  Walking 2 blocks: No difficulty  Walking a mile: A little bit of difficulty  Going up or down 10 stairs (about 1 flight of stairs): A little bit of difficulty  Standing for 1 hour: No difficulty  Sitting for 1 hour: No difficulty  Running on even ground: A little bit of difficulty  Running on uneven ground: A little bit of difficulty  Making sharp turns while running fast: A little bit of difficulty  Hopping: A little bit of difficulty  Rolling over in bed: No difficulty  Total: 70      Time Calculation:   Start Time: 1000  Stop Time: 1048  Time Calculation (min): 48 min  Therapy Charges for Today     Code Description Service Date Service Provider Modifiers Qty    91376679151 HC PT THER SUPP EA 15 MIN 10/14/2022 Ebony Lee, PT GP 1    67042873982 HC GAIT TRAINING EA 15 MIN 10/14/2022 Ebony Lee, PT GP 1    59601874678 HC PT THER PROC EA 15 MIN 10/14/2022 Ebony Lee, PT GP 2          PT G-Codes  Total: 70         Ebony Lee PT , DPT 10/14/2022

## 2022-10-17 ENCOUNTER — APPOINTMENT (OUTPATIENT)
Dept: PHYSICAL THERAPY | Facility: HOSPITAL | Age: 72
End: 2022-10-17

## 2022-10-19 ENCOUNTER — APPOINTMENT (OUTPATIENT)
Dept: PHYSICAL THERAPY | Facility: HOSPITAL | Age: 72
End: 2022-10-19

## 2022-10-21 ENCOUNTER — APPOINTMENT (OUTPATIENT)
Dept: PHYSICAL THERAPY | Facility: HOSPITAL | Age: 72
End: 2022-10-21

## 2025-08-18 ENCOUNTER — DOCUMENTATION (OUTPATIENT)
Dept: CARDIAC SURGERY | Facility: CLINIC | Age: 75
End: 2025-08-18
Payer: MEDICARE

## 2025-08-21 ENCOUNTER — HOSPITAL ENCOUNTER (OUTPATIENT)
Dept: CARDIOLOGY | Facility: HOSPITAL | Age: 75
Discharge: HOME | End: 2025-08-21
Payer: MEDICARE

## 2025-08-21 DIAGNOSIS — I71.21 ASCENDING AORTIC ANEURYSM, UNSPECIFIED WHETHER RUPTURED: Primary | ICD-10-CM

## 2025-08-21 DIAGNOSIS — I71.21 ASCENDING AORTIC ANEURYSM, UNSPECIFIED WHETHER RUPTURED: ICD-10-CM
